# Patient Record
Sex: FEMALE | Race: WHITE
[De-identification: names, ages, dates, MRNs, and addresses within clinical notes are randomized per-mention and may not be internally consistent; named-entity substitution may affect disease eponyms.]

---

## 2017-05-31 ENCOUNTER — HOSPITAL ENCOUNTER (EMERGENCY)
Dept: HOSPITAL 62 - ER | Age: 32
Discharge: HOME | End: 2017-05-31
Payer: OTHER GOVERNMENT

## 2017-05-31 VITALS — SYSTOLIC BLOOD PRESSURE: 140 MMHG | DIASTOLIC BLOOD PRESSURE: 92 MMHG

## 2017-05-31 DIAGNOSIS — E11.9: ICD-10-CM

## 2017-05-31 DIAGNOSIS — Z88.0: ICD-10-CM

## 2017-05-31 DIAGNOSIS — L02.214: Primary | ICD-10-CM

## 2017-05-31 DIAGNOSIS — F17.200: ICD-10-CM

## 2017-05-31 PROCEDURE — 87205 SMEAR GRAM STAIN: CPT

## 2017-05-31 PROCEDURE — 99283 EMERGENCY DEPT VISIT LOW MDM: CPT

## 2017-05-31 PROCEDURE — 87070 CULTURE OTHR SPECIMN AEROBIC: CPT

## 2017-05-31 PROCEDURE — 87075 CULTR BACTERIA EXCEPT BLOOD: CPT

## 2017-05-31 PROCEDURE — 82962 GLUCOSE BLOOD TEST: CPT

## 2017-05-31 PROCEDURE — 87077 CULTURE AEROBIC IDENTIFY: CPT

## 2017-05-31 PROCEDURE — 10060 I&D ABSCESS SIMPLE/SINGLE: CPT

## 2017-05-31 PROCEDURE — 87186 SC STD MICRODIL/AGAR DIL: CPT

## 2017-05-31 NOTE — ER DOCUMENT REPORT
ED Skin Rash/Insect Bite/Abscs





- General


Chief Complaint: Abscess


Stated Complaint: POSSIBLE ABSCESS


Time Seen by Provider: 17 10:22


Notes: 


Patient is a 31-year-old female presents emergency department with a cyst in 

her right groin for 3 months.  Patient states that it has bothered her 

intermittently for the past 3 months.  Currently admits to tenderness to 

palpation without any evidence of redness.  Patient states that she is diabetic 

not been able to check her sugars today.  She has not been able to take her 

insulin today either.


TRAVEL OUTSIDE OF THE [place] IN LAST 30 DAYS: No





- Related Data


Allergies/Adverse Reactions: 


 





Penicillins Allergy (Verified 17 10:07)


 








Home Medications: 


 Current Home Medications





Escitalopram Oxalate [Lexapro 10 mg Tablet] 10 mg PO DAILY 17 [History]


Esomeprazole Mag Trihydrate [Nexium] 40 mg PO DAILY 17 [History]


Gabapentin 400 mg PO TID 17 [History]


Insulin Aspart [Novolog Flexpen] 0 unit SUBCUT .SLD SCALE 17 [History]


Insulin Glargine,Hum.rec.anlog [Toujeo Solostar] 70 unit SQ QHS 17 [

History]


Lisinopril [Prinivil] 20 mg PO DAILY 17 [History]


Loratadine [Claritin] 10 mg PO DAILY PRN 17 [History]


Multivitamin [Multivitamins] 1 each PO DAILY 17 [History]


Simvastatin [Zocor 20 mg Tablet] 20 mg PO QHS 17 [History]











Past Medical History





- Social History


Smoking Status: Current Every Day Smoker


Chew tobacco use (# tins/day): No


Frequency of alcohol use: None


Drug Abuse: None


Family History: Reviewed & Not Pertinent


Patient has suicidal ideation: No


Patient has homicidal ideation: No


Neurological Medical History: Denies: Hx Seizures


Endocrine Medical History: Reports: Hx Diabetes Mellitus Type 2


Renal/ Medical History: Denies: Hx Peritoneal Dialysis


Psychiatric Medical History: Reports: Hx Anxiety, Hx Depression


Past Surgical History: Reports: Hx  Section, Hx Cholecystectomy





- Immunizations


Hx Diphtheria, Pertussis, Tetanus Vaccination: Yes





Review of Systems





- Review of Systems


Constitutional: No symptoms reported


Skin: See HPI


-: Yes All other systems reviewed and negative





Physical Exam





- Notes


Notes: 


PHYSICAL EXAM


GENERAL: Alert, interacts well. 


LUNGS: Clear to auscultation bilaterally, no wheezes, rales, or rhonchi. No 

respiratory distress.


HEART: Regular rate and rhythm. No murmurs, gallops, or rubs.


ABDOMEN: Soft,  nondistended, nontender. No guarding, rebound, or rigidity.. 

Bowel sounds present in all 4 quadrants.


EXTREMITIES: Moves all 4 extremities spontaneously. No edema, radial and 

dorsalis pedis pulses 2/4 bilaterally. No cyanosis. 


NEUROLOGICAL: Alert and oriented x4. Normal speech.


PSYCH: Normal affect, normal mood.








- Skin


Skin Temperature: Warm


Skin Moisture: Dry


Skin Color: Normal


Skin Turgor: Elastic


Skin irregularity: other - Palpable in the right groin closer to her rectum, 

within the dermis without involvement of the epidermis, minimal tenderness


Irregularity with: Tenderness





Course





- Re-evaluation


Re-evalutation: 





17 19:41


This was aspirated for the past using an 18-gauge needle which was extended 

with a scalpel to encourage drainage.  Patient educated on post I&D management 

and will discharge home on antibiotics.  Told to follow-up with primary care 

for adjustments in her diabetic medications.  





- Laboratory


Laboratory results interpreted by me: 


 











  17





  11:57


 


POC Glucose  419 H*














Discharge





- Discharge


Clinical Impression: 


 Abscess





Condition: Good


Disposition: HOME, SELF-CARE


Instructions:  Abscess ([facility]), Cephalexin ([facility]), Trimethoprim-Sulfa ([facility]), Post 

Incision and Drainage, MRSA Cellulitis ([facility])


Prescriptions: 


Cephalexin Monohydrate [Keflex 500 mg Capsule] 500 mg PO QID #20 capsule


Sulfamethoxazole/Trimethoprim [Bactrim Ds Tablet] 1 each PO BID 5 Days


Referrals: 


SHELTON DIAZ MD [Primary Care Provider] - Follow up in 3-5 days

## 2017-06-11 ENCOUNTER — HOSPITAL ENCOUNTER (EMERGENCY)
Dept: HOSPITAL 62 - ER | Age: 32
LOS: 1 days | Discharge: HOME | End: 2017-06-12
Payer: OTHER GOVERNMENT

## 2017-06-11 DIAGNOSIS — E11.9: ICD-10-CM

## 2017-06-11 DIAGNOSIS — Z79.899: ICD-10-CM

## 2017-06-11 DIAGNOSIS — L02.31: Primary | ICD-10-CM

## 2017-06-11 DIAGNOSIS — Z88.0: ICD-10-CM

## 2017-06-11 PROCEDURE — A6266 IMPREG GAUZE NO H20/SAL/YARD: HCPCS

## 2017-06-11 PROCEDURE — 0H98XZZ DRAINAGE OF BUTTOCK SKIN, EXTERNAL APPROACH: ICD-10-PCS | Performed by: GENERAL ACUTE CARE HOSPITAL

## 2017-06-11 PROCEDURE — 99283 EMERGENCY DEPT VISIT LOW MDM: CPT

## 2017-06-11 PROCEDURE — 10060 I&D ABSCESS SIMPLE/SINGLE: CPT

## 2017-06-12 VITALS — DIASTOLIC BLOOD PRESSURE: 74 MMHG | SYSTOLIC BLOOD PRESSURE: 137 MMHG

## 2017-06-12 NOTE — ER DOCUMENT REPORT
ED Skin Rash/Insect Bite/Abscs





- General


Chief Complaint: Abscess Recheck


Stated Complaint: POSSIBLE PAINFUL BOIL/CYST


Time Seen by Provider: 17 00:06


Notes: 


Patient is a 31-year-old female diabetic that comes emergency department for 

chief complaint of a painful area on her right lower buttock near the groin, 

patient states that the area has been worsening over the past 3 days with 

increased size and tenderness.  She states she had the same area drained 1 

month ago.  She states her blood sugars have been improving with her medications

, instead of 500 she is now averaging in the 200s.  Patient denies fever or any 

other symptoms.





TRAVEL OUTSIDE OF THE [place] IN LAST 30 DAYS: No





- Related Data


Allergies/Adverse Reactions: 


 





Penicillins Allergy (Verified 17 10:07)


 











Past Medical History





- General


Information source: Patient





- Social History


Smoking Status: Never Smoker


Frequency of alcohol use: None


Drug Abuse: None


Lives with: Family


Family History: Reviewed & Not Pertinent


Patient has suicidal ideation: No


Patient has homicidal ideation: No


Neurological Medical History: Denies: Hx Seizures


Endocrine Medical History: Reports: Hx Diabetes Mellitus Type 2


Renal/ Medical History: Denies: Hx Peritoneal Dialysis


Psychiatric Medical History: Reports: Hx Anxiety, Hx Depression


Past Surgical History: Reports: Hx  Section, Hx Cholecystectomy





- Immunizations


Hx Diphtheria, Pertussis, Tetanus Vaccination: Yes





Review of Systems





- Review of Systems


Constitutional: No symptoms reported


EENT: No symptoms reported


Cardiovascular: No symptoms reported


Respiratory: No symptoms reported


Gastrointestinal: No symptoms reported


Genitourinary: No symptoms reported


Female Genitourinary: No symptoms reported


Musculoskeletal: No symptoms reported


Skin: See HPI


Hematologic/Lymphatic: No symptoms reported


Neurological/Psychological: No symptoms reported





Physical Exam





- Vital signs


Vitals: 


 











Temp Pulse Resp BP Pulse Ox


 


 97.9 F   114 H  16   141/70 H  96 


 


 17 23:08  17 23:08  17 23:08  17 23:08  17 23:08











Interpretation: Normal





- General


General appearance: Appears well, Alert


In distress: None





- HEENT


Head: Normocephalic, Atraumatic


Eyes: Normal


Pupils: PERRL





- Respiratory


Respiratory status: No respiratory distress


Chest status: Nontender


Breath sounds: Normal


Chest palpation: Normal





- Cardiovascular


Rhythm: Regular.  No: Tachycardia


Heart sounds: Normal auscultation, S1 appreciated, S2 appreciated


Murmur: No





- Abdominal


Inspection: Normal


Distension: No distension


Bowel sounds: Normal


Tenderness: Nontender.  No: Tender


Organomegaly: No organomegaly





- Back


Back: Normal, Nontender





- Extremities


General upper extremity: Normal inspection, Nontender, Normal color, Normal ROM

, Normal temperature


General lower extremity: Normal inspection, Nontender, Normal color, Normal ROM

, Normal temperature, Normal weight bearing.  No: Ketan's sign





- Neurological


Neuro grossly intact: Yes


Cognition: Normal


Orientation: AAOx4


[place] Coma Scale Eye Opening: Spontaneous


[place] Coma Scale Verbal: Oriented


[place] Coma Scale Motor: Obeys Commands


[place] Coma Scale Total: 15


Speech: Normal


Motor strength normal: LUE, RUE, LLE, RLE


Sensory: Normal





- Psychological


Associated symptoms: Normal affect, Normal mood





- Skin


Skin Temperature: Warm


Skin Moisture: Dry


Skin Color: Normal


Skin irregularity: Abscess - There is an erythematous, tender, indurated, 

fluctuant area over the inferior aspect of the right gluteal area near the 

inguinal area, no erythema or induration extending to the anal area, no 

extension towards the genitals.  No nearby fat adenopathy noted.





Course





- Re-evaluation


Re-evalutation: 


Abscess clean, opened, drained, packed, patient will be placed on antibiotics, 

discussed treatment in detail, discussed care, follow-up, return precautions.  

Patient states understanding and agreement.





- Vital Signs


Vital signs: 


 











Temp Pulse Resp BP Pulse Ox


 


 98.1 F   95   16   137/74 H  97 


 


 17 01:45  17 01:45  17 01:45  17 01:45  17 01:45














Procedures





- Incision and Drainage


  ** Right buttock


Type: Single


Anesthetic type: 1% Lidocaine


mL's of anesthetic: 7


Blade size: 11


I&D procedure: Iodoform packing placed, Other - Surgical cleanser


Incision Method: Incision made by scalpel


Amount/type of drainage: Moderate, purulent, bloody





Discharge





- Discharge


Clinical Impression: 


 Abscess





Condition: Stable


Disposition: HOME, SELF-CARE


Additional Instructions: 


Packing needs to come out in 48 hours. 


Take the doxycycline antibiotic as directed, take the pain medication if needed.


Follow up with Primary Care for additional management. 


Return to the ED for any concerning or worsening symptoms - fever, spreading 

redness, etc. 





Prescriptions: 


Doxycycline Hyclate 100 mg PO BID #14 capsule


Oxycodone HCl/Acetaminophen [Percocet 5-325 mg Tablet] 1 - 2 tab PO Q4H PRN #15 

tablet


 PRN Reason: 


Referrals: 


SHELTON DIAZ MD [Primary Care Provider] - Follow up as needed

## 2017-06-14 ENCOUNTER — HOSPITAL ENCOUNTER (EMERGENCY)
Dept: HOSPITAL 62 - ER | Age: 32
Discharge: HOME | End: 2017-06-14
Payer: OTHER GOVERNMENT

## 2017-06-14 VITALS — DIASTOLIC BLOOD PRESSURE: 77 MMHG | SYSTOLIC BLOOD PRESSURE: 134 MMHG

## 2017-06-14 DIAGNOSIS — R11.10: ICD-10-CM

## 2017-06-14 DIAGNOSIS — R09.89: ICD-10-CM

## 2017-06-14 DIAGNOSIS — R05: ICD-10-CM

## 2017-06-14 DIAGNOSIS — B34.9: ICD-10-CM

## 2017-06-14 DIAGNOSIS — R50.9: ICD-10-CM

## 2017-06-14 DIAGNOSIS — L02.31: Primary | ICD-10-CM

## 2017-06-14 PROCEDURE — 99283 EMERGENCY DEPT VISIT LOW MDM: CPT

## 2017-06-14 NOTE — ER DOCUMENT REPORT
ED General





- General


Chief Complaint: Cold Symptoms


Stated Complaint: FEVER/VOMITING


Time Seen by Provider: 17 13:29


Notes: 


30 yo diabetic female c/o cold s/s x 3 days.  coincidentally, pt had abscess I&

D 2 days prior to symptoms starting.  + fever, chills, cough, chest congestion.

  pt removed packing from abscess yesterday, continues to drain.  


TRAVEL OUTSIDE OF THE [place] IN LAST 30 DAYS: No





- HPI


Quality of pain: Pressure, Throbbing


Associated symptoms: Body/muscle aches, Chills, Nonproductive cough, Fever


Exacerbated by: Movement


Relieved by: Denies


Similar symptoms previously: Yes


Recently seen / treated by doctor: Yes - ED 2 days ago





- Related Data


Allergies/Adverse Reactions: 


 





Penicillins Allergy (Verified 17 12:57)


 











Past Medical History





- General


Information source: Patient





- Social History


Smoking Status: Never Smoker


Frequency of alcohol use: None


Drug Abuse: None


Lives with: Family


Family History: Reviewed & Not Pertinent


Patient has suicidal ideation: No


Patient has homicidal ideation: No


Neurological Medical History: Denies: Hx Seizures


Endocrine Medical History: Reports: Hx Diabetes Mellitus Type 2


Renal/ Medical History: Denies: Hx Peritoneal Dialysis


Psychiatric Medical History: Reports: Hx Anxiety, Hx Depression


Past Surgical History: Reports: Hx  Section, Hx Cholecystectomy





- Immunizations


Hx Diphtheria, Pertussis, Tetanus Vaccination: Yes





Review of Systems





- Review of Systems


Constitutional: See HPI


EENT: No symptoms reported


Cardiovascular: No symptoms reported


Respiratory: No symptoms reported


Gastrointestinal: No symptoms reported


Genitourinary: No symptoms reported


Female Genitourinary: No symptoms reported


Musculoskeletal: No symptoms reported


Skin: No symptoms reported


Hematologic/Lymphatic: No symptoms reported


Neurological/Psychological: No symptoms reported





Physical Exam





- Vital signs


Vitals: 





 











Temp Pulse Resp BP Pulse Ox


 


 98.2 F   95   16   134/77 H  96 


 


 17 12:56  17 12:56  17 12:56  17 12:56  17 12:56











Interpretation: Normal





- General


General appearance: Appears well, Alert


In distress: None


Notes: 


morbidly obese





- HEENT


Head: Normocephalic, Atraumatic


Eyes: Normal


Conjunctiva: Normal


Pupils: PERRL





- Respiratory


Respiratory status: No respiratory distress


Chest status: Nontender


Breath sounds: Normal


Chest palpation: Normal





- Cardiovascular


Rhythm: Regular


Heart sounds: Normal auscultation


Murmur: No





- Abdominal


Inspection: Normal


Distension: No distension


Bowel sounds: Normal


Tenderness: Nontender


Organomegaly: No organomegaly





- Back


Back: Normal, Nontender





- Extremities


General upper extremity: Normal inspection, Nontender, Normal color, Normal ROM

, Normal temperature


General lower extremity: Normal inspection, Nontender, Normal color, Normal ROM

, Normal temperature, Normal weight bearing.  No: Ketan's sign





- Neurological


Neuro grossly intact: Yes


Cognition: Normal


Orientation: AAOx4


[place] Coma Scale Eye Opening: Spontaneous


[place] Coma Scale Verbal: Oriented


[place] Coma Scale Motor: Obeys Commands


[place] Coma Scale Total: 15


Speech: Normal


Motor strength normal: LUE, RUE, LLE, RLE


Sensory: Normal





- Psychological


Associated symptoms: Normal affect, Normal mood





- Skin


Skin Temperature: Warm


Skin Moisture: Dry


Skin Color: Normal


Skin irregularity: Abscess - right gluteal fold with draining abscess.  

moderate amount of purulent material expressed.  no surrounding erythema





Course





- Re-evaluation


Re-evalutation: 





17 13:50


wound culture reviewed from previous visit.  + peptostrepto and + enterococcus 

faecalis.  pt has been covered with Bactrim, Keflex and presently Doxy.  

Allergic to PCN.  will add Flagyl per C&S report.  I think URI s/s are 

concurrent viral infection and most likely not related to wound.  low suspicion 

for sepsis.  pt afebrile, normtensive, not tachycardic.  pt educated on wound 

care for abscess and instructed to follow up with primary care tomorrow.  pt 

stable for discharge and agreeable with plan





- Vital Signs


Vital signs: 





 











Temp Pulse Resp BP Pulse Ox


 


 98.2 F   95   16   134/77 H  96 


 


 17 12:56  17 12:56  17 12:56  17 12:56  17 12:56














Discharge





- Discharge


Clinical Impression: 


 Viral illness, Wound check, abscess





Condition: Stable


Disposition: HOME, SELF-CARE


Instructions:  Viral Syndrome ([facility]), Abscess ([facility]), Soap Cleansing ([facility]), 

Antibiotic Therapy ([facility])


Additional Instructions: 


wash wound with antibacterial soap and water, irrigate well


take antibiotics as prescribed


follow up with primary care tomorrow


Prescriptions: 


Fluconazole [Diflucan] 150 mg PO DAILY #2 tablet


Metronidazole [Flagyl 500 mg Tablet] 500 mg PO BID #14 tablet

## 2018-03-09 ENCOUNTER — HOSPITAL ENCOUNTER (INPATIENT)
Dept: HOSPITAL 62 - ER | Age: 33
LOS: 5 days | Discharge: HOME HEALTH | DRG: 580 | End: 2018-03-14
Attending: INTERNAL MEDICINE | Admitting: INTERNAL MEDICINE
Payer: COMMERCIAL

## 2018-03-09 DIAGNOSIS — F41.9: ICD-10-CM

## 2018-03-09 DIAGNOSIS — F32.9: ICD-10-CM

## 2018-03-09 DIAGNOSIS — L02.413: Primary | ICD-10-CM

## 2018-03-09 DIAGNOSIS — B95.5: ICD-10-CM

## 2018-03-09 DIAGNOSIS — B95.61: ICD-10-CM

## 2018-03-09 DIAGNOSIS — L03.113: ICD-10-CM

## 2018-03-09 DIAGNOSIS — Z98.891: ICD-10-CM

## 2018-03-09 DIAGNOSIS — Z79.4: ICD-10-CM

## 2018-03-09 DIAGNOSIS — E66.9: ICD-10-CM

## 2018-03-09 DIAGNOSIS — F17.210: ICD-10-CM

## 2018-03-09 DIAGNOSIS — S50.11XA: ICD-10-CM

## 2018-03-09 DIAGNOSIS — Z90.49: ICD-10-CM

## 2018-03-09 DIAGNOSIS — E11.65: ICD-10-CM
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
## 2018-03-09 DIAGNOSIS — F14.10: ICD-10-CM

## 2018-03-09 DIAGNOSIS — Z88.0: ICD-10-CM
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
## 2018-03-09 DIAGNOSIS — E11.42: ICD-10-CM

## 2018-03-09 LAB
ADD MANUAL DIFF: NO
ANION GAP SERPL CALC-SCNC: 13 MMOL/L (ref 5–19)
APPEARANCE UR: (no result)
APTT PPP: YELLOW S
BASOPHILS # BLD AUTO: 0.1 10^3/UL (ref 0–0.2)
BASOPHILS NFR BLD AUTO: 0.8 % (ref 0–2)
BILIRUB UR QL STRIP: NEGATIVE
BUN SERPL-MCNC: 11 MG/DL (ref 7–20)
CALCIUM: 9.2 MG/DL (ref 8.4–10.2)
CHLORIDE SERPL-SCNC: 96 MMOL/L (ref 98–107)
CO2 SERPL-SCNC: 25 MMOL/L (ref 22–30)
EOSINOPHIL # BLD AUTO: 0.2 10^3/UL (ref 0–0.6)
EOSINOPHIL NFR BLD AUTO: 1.2 % (ref 0–6)
ERYTHROCYTE [DISTWIDTH] IN BLOOD BY AUTOMATED COUNT: 14 % (ref 11.5–14)
GLUCOSE SERPL-MCNC: 286 MG/DL (ref 75–110)
GLUCOSE UR STRIP-MCNC: >=500 MG/DL
HCT VFR BLD CALC: 42.3 % (ref 36–47)
HGB BLD-MCNC: 14.1 G/DL (ref 12–15.5)
KETONES UR STRIP-MCNC: NEGATIVE MG/DL
LYMPHOCYTES # BLD AUTO: 3.1 10^3/UL (ref 0.5–4.7)
LYMPHOCYTES NFR BLD AUTO: 19 % (ref 13–45)
MCH RBC QN AUTO: 30.5 PG (ref 27–33.4)
MCHC RBC AUTO-ENTMCNC: 33.4 G/DL (ref 32–36)
MCV RBC AUTO: 91 FL (ref 80–97)
MONOCYTES # BLD AUTO: 1 10^3/UL (ref 0.1–1.4)
MONOCYTES NFR BLD AUTO: 6 % (ref 3–13)
NEUTROPHILS # BLD AUTO: 11.8 10^3/UL (ref 1.7–8.2)
NEUTS SEG NFR BLD AUTO: 73 % (ref 42–78)
NITRITE UR QL STRIP: NEGATIVE
PH UR STRIP: 7 [PH] (ref 5–9)
PLATELET # BLD: 342 10^3/UL (ref 150–450)
POTASSIUM SERPL-SCNC: 4.2 MMOL/L (ref 3.6–5)
PROT UR STRIP-MCNC: NEGATIVE MG/DL
RBC # BLD AUTO: 4.63 10^6/UL (ref 3.72–5.28)
SODIUM SERPL-SCNC: 133.6 MMOL/L (ref 137–145)
SP GR UR STRIP: 1.04
TOTAL CELLS COUNTED % (AUTO): 100 %
UROBILINOGEN UR-MCNC: 4 MG/DL (ref ?–2)
WBC # BLD AUTO: 16.2 10^3/UL (ref 4–10.5)

## 2018-03-09 PROCEDURE — 82553 CREATINE MB FRACTION: CPT

## 2018-03-09 PROCEDURE — 85025 COMPLETE CBC W/AUTO DIFF WBC: CPT

## 2018-03-09 PROCEDURE — 90686 IIV4 VACC NO PRSV 0.5 ML IM: CPT

## 2018-03-09 PROCEDURE — 96375 TX/PRO/DX INJ NEW DRUG ADDON: CPT

## 2018-03-09 PROCEDURE — 82962 GLUCOSE BLOOD TEST: CPT

## 2018-03-09 PROCEDURE — 84484 ASSAY OF TROPONIN QUANT: CPT

## 2018-03-09 PROCEDURE — 36415 COLL VENOUS BLD VENIPUNCTURE: CPT

## 2018-03-09 PROCEDURE — 84443 ASSAY THYROID STIM HORMONE: CPT

## 2018-03-09 PROCEDURE — 87077 CULTURE AEROBIC IDENTIFY: CPT

## 2018-03-09 PROCEDURE — 84439 ASSAY OF FREE THYROXINE: CPT

## 2018-03-09 PROCEDURE — 99284 EMERGENCY DEPT VISIT MOD MDM: CPT

## 2018-03-09 PROCEDURE — 87075 CULTR BACTERIA EXCEPT BLOOD: CPT

## 2018-03-09 PROCEDURE — 90471 IMMUNIZATION ADMIN: CPT

## 2018-03-09 PROCEDURE — 83036 HEMOGLOBIN GLYCOSYLATED A1C: CPT

## 2018-03-09 PROCEDURE — 96365 THER/PROPH/DIAG IV INF INIT: CPT

## 2018-03-09 PROCEDURE — 90715 TDAP VACCINE 7 YRS/> IM: CPT

## 2018-03-09 PROCEDURE — 81001 URINALYSIS AUTO W/SCOPE: CPT

## 2018-03-09 PROCEDURE — 84100 ASSAY OF PHOSPHORUS: CPT

## 2018-03-09 PROCEDURE — 80061 LIPID PANEL: CPT

## 2018-03-09 PROCEDURE — 81025 URINE PREGNANCY TEST: CPT

## 2018-03-09 PROCEDURE — 80307 DRUG TEST PRSMV CHEM ANLYZR: CPT

## 2018-03-09 PROCEDURE — 00400 ANES INTEGUMENTARY SYS NOS: CPT

## 2018-03-09 PROCEDURE — 87040 BLOOD CULTURE FOR BACTERIA: CPT

## 2018-03-09 PROCEDURE — 82550 ASSAY OF CK (CPK): CPT

## 2018-03-09 PROCEDURE — 82140 ASSAY OF AMMONIA: CPT

## 2018-03-09 PROCEDURE — 80076 HEPATIC FUNCTION PANEL: CPT

## 2018-03-09 PROCEDURE — 87070 CULTURE OTHR SPECIMN AEROBIC: CPT

## 2018-03-09 PROCEDURE — A6266 IMPREG GAUZE NO H20/SAL/YARD: HCPCS

## 2018-03-09 PROCEDURE — 87205 SMEAR GRAM STAIN: CPT

## 2018-03-09 PROCEDURE — 83735 ASSAY OF MAGNESIUM: CPT

## 2018-03-09 PROCEDURE — 80048 BASIC METABOLIC PNL TOTAL CA: CPT

## 2018-03-09 PROCEDURE — 87186 SC STD MICRODIL/AGAR DIL: CPT

## 2018-03-09 PROCEDURE — S0028 INJECTION, FAMOTIDINE, 20 MG: HCPCS

## 2018-03-09 RX ADMIN — SODIUM CHLORIDE PRN ML: 9 INJECTION, SOLUTION INTRAVENOUS at 23:55

## 2018-03-09 NOTE — RADIOLOGY REPORT (SQ)
EXAM DESCRIPTION:  FOREARM RIGHT



COMPLETED DATE/TIME:  3/9/2018 10:21 pm



REASON FOR STUDY:  fall, swelling, pain



COMPARISON:  None.



NUMBER OF VIEWS:  Two views.



TECHNIQUE:  Two radiographic images acquired of the right forearm, including elbow and wrist in at le
ast one projection.



LIMITATIONS:  None.



FINDINGS:  MINERALIZATION: Normal.

BONES: No acute fracture.  No worrisome bone lesions.

SOFT TISSUES: Mild swelling.

OTHER: No other significant finding.



IMPRESSION:  No fracture.



TECHNICAL DOCUMENTATION:  JOB ID:  [id number]

[id number]

 2011 [employer]- All Rights Reserved



Reading location - IP/workstation name: [email or web address]

## 2018-03-09 NOTE — ER DOCUMENT REPORT
ED Medical Screen (RME)





- General


Chief Complaint: Skin Problem


Stated Complaint: RIGHT ARM PAIN, SWELLING


Time Seen by Provider: 18 21:36


Notes: 





32-year-old injury where her arm went through a surface and she got splinters 

in her arm, she states afterwards she noticed an infection on her upper arm 

near her armpit, then 1 week ago she started getting redness and pain near her 

AC area.  She now has an abscess over the AC area with pain in addition to the 

arm swelling and bruising.  She states she had chills and thought she was 

running a fever, she is an insulin-dependent diabetic and states her sugars 

have been all over the place.  She denies IV drug abuse.


TRAVEL OUTSIDE OF THE [place] IN LAST 30 DAYS: No





- Related Data


Allergies/Adverse Reactions: 


 





Penicillins Allergy (Verified 18 19:38)


 











Past Medical History





- Social History


Chew tobacco use (# tins/day): No


Frequency of alcohol use: None


Drug Abuse: None


Neurological Medical History: Denies: Hx Seizures


Endocrine Medical History: Reports: Hx Diabetes Mellitus Type 2


Renal/ Medical History: Denies: Hx Peritoneal Dialysis


Psychiatric Medical History: Reports: Hx Anxiety, Hx Depression


Past Surgical History: Reports: Hx  Section, Hx Cholecystectomy





- Immunizations


Hx Diphtheria, Pertussis, Tetanus Vaccination: Yes





Physical Exam





- Vital signs


Vitals: 





 











Temp Pulse Resp BP Pulse Ox


 


 98.4 F   105 H  16   160/73 H  98 


 


 18 19:55  18 19:55  18 19:55  18 19:55  18 19:55














- Cardiovascular


Rhythm: Regular, Tachycardia


Heart sounds: Normal auscultation, S1 appreciated, S2 appreciated





- Extremities


General upper extremity: Other - Right AC area with tender, erythematous, 

fluctuant abscess, there is bruising to the forearm, normal distal 

neurovascular exam





Course





- Vital Signs


Vital signs: 





 











Temp Pulse Resp BP Pulse Ox


 


 98.4 F   105 H  16   160/73 H  98 


 


 18 19:55  18 19:55  18 19:55  18 19:55  18 19:55

## 2018-03-09 NOTE — PDOC CONSULTATION
History of Present Illness


Admission Date/PCP: 


  18 23:22





  SHELTON JOE





Patient complains of: Pains right antecubital area


History of Present Illness: 


GONZALO MEEK is a 32 year old female who fell on her porch and injured right 

arm through a hole on the wooden floor about a week ago. C/O a bruise right arm 

followed by redness and pains which are getting worse over the past few days. 

She tried placing warm compresses over  her right arm without improvement. 

Today got really worse and painful then went to ED. She did have low grade 

fever of 99.9 but no chills. C/O pains all over right arm.








Past Medical History


Neurological Medical History: 


   Denies: Seizures


Endocrine Medical History: Reports: Diabetes Mellitus Type 2


Psychiatric Medical History: Reports: Depression





Past Surgical History


Past Surgical History: Reports:  Section, Cholecystectomy





Social History


Smoking Status: Current Every Day Smoker


Cigarettes Packs Per Day: 0.5


Frequency of Alcohol Use: Rare


Hx Recreational Drug Use: Yes - cocaine in the past


Drugs: Cocaine, Marijuana


Hx Prescription Drug Abuse: No





Family History


Family History: Reviewed & Not Pertinent


Parental Family History Reviewed: Yes - father  of alcohol abuse age53


Children Family History Reviewed: No


Sibling(s) Family History Reviewed.: No





Medication/Allergy


Home Medications: 








Cephalexin Monohydrate [Keflex 500 mg Capsule] 500 mg PO QID #20 capsule  


Escitalopram Oxalate [Lexapro 10 mg Tablet] 10 mg PO DAILY 17 


Esomeprazole Mag Trihydrate [Nexium] 40 mg PO DAILY 17 


Gabapentin 400 mg PO TID 17 


Insulin Aspart [Novolog Flexpen] 0 unit SUBCUT .SLD SCALE 17 


Insulin Glargine,Hum.rec.anlog [Toujeo Solostar] 70 unit SQ QHS 17 


Lisinopril [Prinivil] 20 mg PO DAILY 17 


Loratadine [Claritin] 10 mg PO DAILY PRN 17 


Multivitamin [Multivitamins] 1 each PO DAILY 17 


Simvastatin [Zocor 20 mg Tablet] 20 mg PO QHS 17 


Sulfamethoxazole/Trimethoprim [Bactrim Ds Tablet] 1 each PO BID 5 Days  tablet 

17 


Doxycycline Hyclate 100 mg PO BID #14 capsule 17 


Oxycodone HCl/Acetaminophen [Percocet 5-325 mg Tablet] 1 - 2 tab PO Q4H PRN #15 

tablet 17 


Fluconazole [Diflucan] 150 mg PO DAILY #2 tablet 17 


Metronidazole [Flagyl 500 mg Tablet] 500 mg PO BID #14 tablet 17 








Allergies/Adverse Reactions: 


 





Penicillins Allergy (Verified 18 19:38)


 











Review of Systems


Constitutional: PRESENT: fever(s)


Eyes: PRESENT: other - no visual/hearing changes


Cardiovascular: PRESENT: other - no chest pains


Gastrointestinal: PRESENT: other - no pains


Genitourinary: PRESENT: other - no dysuria


Integumentary: PRESENT: erythema - and swelling right arm


Neurological: PRESENT: other - no seizures


Hematologic/Lymphatic: PRESENT: other - no easy bruising





Physical Exam


Vital Signs: 


 











Temp Pulse Resp BP Pulse Ox


 


 98.4 F   105 H  16   160/73 H  98 


 


 18 19:55  18 19:55  18 19:55  18 19:55  18 19:55











General appearance: PRESENT: mild distress


Head exam: PRESENT: atraumatic


Mouth exam: PRESENT: moist


Neck exam: PRESENT: full ROM


Respiratory exam: PRESENT: clear to auscultation van


Cardiovascular exam: PRESENT: RRR


Pulses: PRESENT: normal radial pulses


Vascular exam: PRESENT: normal capillary refill


GI/Abdominal exam: PRESENT: soft


Extremities exam: PRESENT: tenderness, other - erythematous swelling with 

firmness about 8 cm in diameter antecubital area right arm. More diffuse slight 

erythema wit mild tenderness superior and inferior to the swelling


Musculoskeletal exam: PRESENT: ambulatory


Neurological exam: PRESENT: alert, oriented to person, oriented to place, 

oriented to time, oriented to situation


Psychiatric exam: PRESENT: appropriate affect


Skin exam: PRESENT: normal color, warm





Results


Impressions: 


 





Forearm X-Ray  18 21:36


IMPRESSION:  No fracture.


 














Assessment & Plan





- Diagnosis








(2) Diabetes


Qualifiers: 


   Diabetes mellitus type: type 2   Diabetes mellitus long term insulin use: 

without long term use   Diabetes mellitus complication status: with skin 

complications   Diabetes mellitus complication detail: with other skin 

complication   Qualified Code(s): E11.628 - Type 2 diabetes mellitus with other 

skin complications   


Is this a current diagnosis for this admission?: Yes   





- Time


Time Spent: 30 to 50 Minutes





- Inpatient Certification


Based on my medical assessment, after consideration of the patient's 

comorbidities, presenting symptoms, or acuity I expect that the services needed 

warrant INPATIENT care.: No


I certify that my determination is in accordance with my understanding of 

Medicare's requirements for reasonable and necessary INPATIENT services [42 CFR 

412.3e].: Yes


Medical Necessity: Need For IV Fluids, Need for Pain Control, Need for IV 

Antibiotics, Need for Surgery





- Plan Summary


Plan Summary: 





Medical mx of DM


IV antibiotics after blood c/s


For I&D of right antecubital abscess

## 2018-03-09 NOTE — ER DOCUMENT REPORT
ED General





- General


Chief Complaint: Skin Problem


Stated Complaint: RIGHT ARM PAIN, SWELLING


Time Seen by Provider: 18 21:36


Mode of Arrival: Ambulatory


Information source: Patient


Notes: 





32-year-old female diabetic presents with complaints of right antecubital 

swelling and redness.  Patient notes that symptoms have been ongoing for 

approximately 1 week, denies any fevers or chills, notes her blood sugars have 

been elevated.  Patient does note redness, she has had 4 previous abscesses


TRAVEL OUTSIDE OF THE [place] IN LAST 30 DAYS: No





- HPI


Onset: Last week


Onset/Duration: Persistent, Worse


Quality of pain: Achy


Severity: Mild


Pain Level: 2


Associated symptoms: Other


Exacerbated by: Denies


Relieved by: Denies


Similar symptoms previously: Yes


Recently seen / treated by doctor: Yes





- Related Data


Allergies/Adverse Reactions: 


 





Penicillins Allergy (Verified 18 19:38)


 











Past Medical History





- Social History


Smoking Status: Current Every Day Smoker


Cigarette use (# per day): Yes


Chew tobacco use (# tins/day): No


Smoking Education Provided: No


Frequency of alcohol use: None


Drug Abuse: None


Family History: Reviewed & Not Pertinent


Patient has suicidal ideation: No


Patient has homicidal ideation: No


Neurological Medical History: Denies: Hx Seizures


Endocrine Medical History: Reports: Hx Diabetes Mellitus Type 2


Renal/ Medical History: Denies: Hx Peritoneal Dialysis


Psychiatric Medical History: Reports: Hx Anxiety, Hx Depression


Past Surgical History: Reports: Hx  Section, Hx Cholecystectomy





- Immunizations


Hx Diphtheria, Pertussis, Tetanus Vaccination: Yes





Review of Systems





- Review of Systems


Notes: 





REVIEW OF SYSTEMS:


CONSTITUTIONAL :  Denies fever,  chills, or sweats.  Denies recent illness.


EENT:   Denies eye, ear, throat, or mouth pain or symptoms.  Denies nasal or 

sinus congestion or discharge.  Denies throat, tongue, or mouth swelling or 

difficulty swallowing.


CARDIOVASCULAR:  Denies chest pain.  Denies palpitations or racing or irregular 

heart beat.  Denies ankle edema.


RESPIRATORY:  Denies cough, cold, or chest congestion.  Denies shortness of 

breath, difficulty breathing, or wheezing.


GASTROINTESTINAL:  Denies abdominal pain or distention.  Denies nausea, vomiting

, or diarrhea.  Denies blood in vomitus, stools, or per rectum.  Denies black, 

tarry stools.  Denies constipation. 


GENITOURINARY:  Denies difficulty urinating, painful urination, burning, 

frequency, blood in urine, or discharge.


FEMALE  GENITOURINARY:  Denies vaginal bleeding, heavy or abnormal periods, 

irregular periods.  Denies vaginal discharge or odor. 


MUSCULOSKELETAL:  Denies back or neck pain or stiffness.  Denies joint pain or 

swelling.


SKIN: Admits to redness swelling of the right antecubital


HEMATOLOGIC :   Denies easy bruising or bleeding.


LYMPHATIC:  Denies swollen, enlarged glands.


NEUROLOGICAL:  Denies confusion or altered mental status.  Denies passing out 

or loss of consciousness.  Denies dizziness or lightheadedness.  Denies 

headache.  Denies weakness or paralysis or loss of use of either side.  Denies 

problems with gait or speech.  Denies sensory loss, numbness, or tingling.  

Denies seizures.


PSYCHIATRIC:  Denies anxiety or stress.  Denies depression, suicidal ideation, 

or homicidal ideation.





ALL OTHER SYSTEMS REVIEWED AND NEGATIVE.











PHYSICAL EXAMINATION:





GENERAL: Well-appearing, well-nourished and in no acute distress.





HEAD: Atraumatic, normocephalic.





EYES: Pupils equal round and reactive to light, extraocular movements intact, 

conjunctiva are normal.





ENT: Nares patent, oropharynx clear without exudates.  Moist mucous membranes.





NECK: Normal range of motion, supple without lymphadenopathy





LUNGS: Breath sounds clear to auscultation bilaterally and equal.  No wheezes 

rales or rhonchi.





HEART: Regular rate and rhythm without murmurs





ABDOMEN: Soft, nontender, nondistended abdomen.  No guarding, no rebound.  No 

masses appreciated.





Female : deferred





Musculoskeletal: Normal range of motion, no pitting or edema.  No cyanosis.





NEUROLOGICAL: Cranial nerves grossly intact.  Normal speech, normal gait.  

Normal sensory, motor exams





PSYCH: Normal mood, normal affect.





SKIN: Abscess noted of the right antecubital measuring approximately 6 x 6 cm 

with streaking mid bicep

















Dictation was performed using Dragon voice recognition software





Physical Exam





- Vital signs


Vitals: 


 











Temp Pulse Resp BP Pulse Ox


 


 98.4 F   105 H  16   160/73 H  98 


 


 18 19:55  18 19:55  18 19:55  18 19:55  18 19:55














Course





- Re-evaluation


Re-evalutation: 


Deep abscesses noted, patient is noted to be tachycardic, I do believe they 

will require surgical intervention


18 22:49


I spoke with  and requested admission for abscess , elevated wbc 

count 


03/10/18 00:21


Patient was admitted to primary care physician clindamycin 900 mg was given 

plan to take to the OR tomorrow





- Vital Signs


Vital signs: 


 











Temp Pulse Resp BP Pulse Ox


 


 98.4 F   105 H  16   160/73 H  98 


 


 18 19:55  18 19:55  18 19:55  18 19:55  18 19:55














- Laboratory


Result Diagrams: 


 18 22:08





 18 22:08


Laboratory results interpreted by me: 


 











  18





  22:00 22:08 22:08


 


WBC   16.2 H 


 


Absolute Neutrophils   11.8 H 


 


Sodium    133.6 L


 


Chloride    96 L


 


Glucose    286 H


 


Urine Glucose (UA)  >=500 H  


 


Urine Blood  MODERATE H  


 


Urine Urobilinogen  4.0 H  














- Diagnostic Test


Radiology reviewed: Image reviewed, Reports reviewed - No acute fracture





Discharge





- Discharge


Clinical Impression: 


 Cellulitis and abscess of upper arm and forearm





Diabetes


Qualifiers:


 Diabetes mellitus type: type 2 Diabetes mellitus long term insulin use: 

without long term use Diabetes mellitus complication status: with skin 

complications Diabetes mellitus complication detail: with other skin 

complication Qualified Code(s): E11.628 - Type 2 diabetes mellitus with other 

skin complications





Condition: Fair


Disposition: ADMITTED AS INPATIENT


Admitting Provider: Kellee


Unit Admitted: Surgical Floor

## 2018-03-10 LAB
BARBITURATES UR QL SCN: NEGATIVE
CK MB SERPL-MCNC: < 0.22 NG/ML (ref ?–4.55)
FREE T4 (FREE THYROXINE): 1.39 NG/DL (ref 0.78–2.19)
METHADONE UR QL SCN: NEGATIVE
PCP UR QL SCN: NEGATIVE
PHOSPHATE SERPL-MCNC: 4.1 MG/DL (ref 2.5–4.5)
TROPONIN I SERPL-MCNC: < 0.012 NG/ML
TSH SERPL-ACNC: 0.87 UIU/ML (ref 0.47–4.68)
URINE AMPHETAMINES SCREEN: NEGATIVE
URINE BENZODIAZEPINES SCREEN: NEGATIVE
URINE COCAINE SCREEN: (no result)
URINE MARIJUANA (THC) SCREEN: NEGATIVE

## 2018-03-10 PROCEDURE — 0JDG0ZZ EXTRACTION OF RIGHT LOWER ARM SUBCUTANEOUS TISSUE AND FASCIA, OPEN APPROACH: ICD-10-PCS | Performed by: SURGERY

## 2018-03-10 PROCEDURE — 0J9G0ZZ DRAINAGE OF RIGHT LOWER ARM SUBCUTANEOUS TISSUE AND FASCIA, OPEN APPROACH: ICD-10-PCS | Performed by: SURGERY

## 2018-03-10 RX ADMIN — CLINDAMYCIN PHOSPHATE SCH ML: 12 INJECTION, SOLUTION INTRAVENOUS at 05:18

## 2018-03-10 RX ADMIN — CLINDAMYCIN PHOSPHATE SCH ML: 12 INJECTION, SOLUTION INTRAVENOUS at 14:10

## 2018-03-10 RX ADMIN — KETOROLAC TROMETHAMINE SCH MG: 30 INJECTION, SOLUTION INTRAMUSCULAR at 14:10

## 2018-03-10 RX ADMIN — KETOROLAC TROMETHAMINE SCH MG: 30 INJECTION, SOLUTION INTRAMUSCULAR at 21:44

## 2018-03-10 RX ADMIN — DOCUSATE SODIUM SCH MG: 100 CAPSULE, LIQUID FILLED ORAL at 17:21

## 2018-03-10 RX ADMIN — CLINDAMYCIN PHOSPHATE SCH ML: 12 INJECTION, SOLUTION INTRAVENOUS at 21:43

## 2018-03-10 RX ADMIN — INSULIN LISPRO PRN UNIT: 100 INJECTION, SOLUTION INTRAVENOUS; SUBCUTANEOUS at 08:01

## 2018-03-10 RX ADMIN — INSULIN LISPRO PRN UNIT: 100 INJECTION, SOLUTION INTRAVENOUS; SUBCUTANEOUS at 21:44

## 2018-03-10 RX ADMIN — FENTANYL CITRATE PRN MCG: 50 INJECTION INTRAMUSCULAR; INTRAVENOUS at 05:17

## 2018-03-10 RX ADMIN — FENTANYL CITRATE PRN MCG: 50 INJECTION INTRAMUSCULAR; INTRAVENOUS at 01:41

## 2018-03-10 RX ADMIN — INSULIN LISPRO PRN UNIT: 100 INJECTION, SOLUTION INTRAVENOUS; SUBCUTANEOUS at 16:46

## 2018-03-10 RX ADMIN — INSULIN LISPRO PRN UNIT: 100 INJECTION, SOLUTION INTRAVENOUS; SUBCUTANEOUS at 14:08

## 2018-03-10 RX ADMIN — KETOROLAC TROMETHAMINE SCH MG: 30 INJECTION, SOLUTION INTRAMUSCULAR at 05:17

## 2018-03-10 NOTE — PDOC H&P
History of Present Illness


Admission Date/PCP: 


  18 23:22





  SHELTONJEREMY DIAZ





History of Present Illness: 





Patient is a 32-year-old female with history of diabetes mellitus, she came to 

the emergency room for evaluation of swelling and pain of the right arm in the 

antecubital fossa, for the last 1 week.  The swelling was progressively getting 

bigger there was associated chills or fever, she could emergency room for 

evaluation there was associated leukocytosis.  She came to the emergency room 

last night, she was seen by the surgeon, she underwent incision and drainage 

this morning.  She was found to have complex superficial and deep abscess 

involving the antecubital fossa skin, subcutaneous tissue fascia muscle.





Past Medical History


Endocrine Medical History: Reports: Diabetes Mellitus Type 2, Obesity


Psychiatric Medical History: Reports: Depression





Past Surgical History


Past Surgical History: Reports:  Section, Cholecystectomy





Social History


Smoking Status: Current Every Day Smoker


Cigarettes Packs Per Day: 0.5


Frequency of Alcohol Use: None


Hx Recreational Drug Use: Yes - cocaine in the past


Drugs: None


Hx Prescription Drug Abuse: No





- Advance Directive


Resuscitation Status: Full Code





Family History


Family History: Reviewed & Not Pertinent


Parental Family History Reviewed: Yes


Children Family History Reviewed: Yes


Sibling(s) Family History Reviewed.: Yes





Medication/Allergy


Home Medications: 








Cephalexin Monohydrate [Keflex 500 mg Capsule] 500 mg PO QID #20 capsule  


Escitalopram Oxalate [Lexapro 10 mg Tablet] 10 mg PO DAILY 17 


Esomeprazole Mag Trihydrate [Nexium] 40 mg PO DAILY 17 


Gabapentin 400 mg PO TID 17 


Insulin Aspart [Novolog Flexpen] 0 unit SUBCUT .SLD SCALE 17 


Insulin Glargine,Hum.rec.anlog [Toujeo Solostar] 70 unit SQ QHS 17 


Lisinopril [Prinivil] 20 mg PO DAILY 17 


Loratadine [Claritin] 10 mg PO DAILY PRN 17 


Multivitamin [Multivitamins] 1 each PO DAILY 17 


Simvastatin [Zocor 20 mg Tablet] 20 mg PO QHS 17 


Sulfamethoxazole/Trimethoprim [Bactrim Ds Tablet] 1 each PO BID 5 Days  tablet 

17 


Doxycycline Hyclate 100 mg PO BID #14 capsule 17 


Oxycodone HCl/Acetaminophen [Percocet 5-325 mg Tablet] 1 - 2 tab PO Q4H PRN #15 

tablet 17 


Fluconazole [Diflucan] 150 mg PO DAILY #2 tablet 17 


Metronidazole [Flagyl 500 mg Tablet] 500 mg PO BID #14 tablet 17 








Allergies/Adverse Reactions: 


 





Penicillins Allergy (Verified 18 19:38)


 











Review of Systems


Constitutional: ABSENT: chills, fever(s), headache(s), weight gain, weight loss


Eyes: ABSENT: visual disturbances


Ears: ABSENT: hearing changes


Cardiovascular: ABSENT: chest pain, dyspnea on exertion, edema, orthropnea, 

palpitations


Respiratory: ABSENT: cough, hemoptysis


Gastrointestinal: ABSENT: abdominal pain, constipation, diarrhea, hematemesis, 

hematochezia, nausea, vomiting


Genitourinary: ABSENT: dysuria, hematuria


Musculoskeletal: PRESENT: other - Right arm pain


Integumentary: ABSENT: rash, wounds


Neurological: ABSENT: abnormal gait, abnormal speech, confusion, dizziness, 

focal weakness, syncope


Psychiatric: ABSENT: anxiety, depression, homidical ideation, suicidal ideation


Endocrine: ABSENT: cold intolerance, heat intolerance, menstrual abnormalities, 

polydipsia, polyuria


Hematologic/Lymphatic: ABSENT: easy bleeding, easy bruising, lymphadenopathy





Physical Exam


Vital Signs: 


 











Temp Pulse Resp BP Pulse Ox


 


 98.3 F   92   16   108/65   96 


 


 03/10/18 07:20  03/10/18 07:42  03/10/18 07:20  03/10/18 07:20  03/10/18 07:20








 Intake & Output











 03/09/18 03/10/18 03/11/18





 06:59 06:59 07:59


 


Intake Total   125


 


Output Total   125


 


Balance   0


 


Weight  127.6 kg 











General appearance: PRESENT: no acute distress, well-developed, well-nourished


Head exam: PRESENT: atraumatic, normocephalic


Eye exam: PRESENT: conjunctiva pink, EOMI, PERRLA


Ear exam: PRESENT: normal external ear exam


Mouth exam: PRESENT: moist, tongue midline


Neck exam: PRESENT: full ROM


Respiratory exam: PRESENT: clear to auscultation van


Cardiovascular exam: PRESENT: RRR, +S1, +S2


Pulses: PRESENT: normal dorsalis pedis pul, +2 pedal pulses bilateral


Vascular exam: PRESENT: normal capillary refill


GI/Abdominal exam: PRESENT: normal bowel sounds, soft


Rectal exam: PRESENT: deferred


Extremities exam: PRESENT: other - She has right arm tenderness, swelling, 

limitation of range of motion


Neurological exam: PRESENT: alert, awake, oriented to person, oriented to place

, oriented to time, oriented to situation, CN II-XII grossly intact


Psychiatric exam: PRESENT: appropriate affect, normal mood


Skin exam: PRESENT: dry, intact, warm





Results


Laboratory Results: 


 











  03/10/18 03/10/18 03/10/18





  03:28 03:28 03:28


 


Phosphorus  4.1  


 


Magnesium  1.6  


 


Ammonia    < 8.7 L


 


TSH   0.87 


 


Free T4   1.39 








 











  03/10/18 03/10/18 03/10/18





  03:28 03:28 09:14


 


Creatine Kinase  < 20 L   < 20 L


 


CK-MB (CK-2)   < 0.22 


 


Troponin I   < 0.012 














  03/10/18





  09:14


 


Creatine Kinase 


 


CK-MB (CK-2)  < 0.22


 


Troponin I  < 0.012











Impressions: 


 





Forearm X-Ray  18 21:36


IMPRESSION:  No fracture.


 














Assessment & Plan





- Diagnosis


(1) Abscess of left arm


Plan: 


Continue IV antibiotic clindamycin, 600 mg IV every 6








(2) Diabetes mellitus


Qualifiers: 


   Diabetes mellitus type: type 2   Diabetes mellitus complication status: with 

neurologic complications   Diabetes mellitus complication detail: with 

polyneuropathy 


Is this a current diagnosis for this admission?: Yes

## 2018-03-10 NOTE — OPERATIVE REPORT
Operative Report


DATE OF SURGERY: 03/10/18


PREOPERATIVE DIAGNOSIS: Right arm abscess antecubital fossa


POSTOPERATIVE DIAGNOSIS: Complex, superficial and deep abscess involving the 

antecubital fossa skin subcutaneous tissue fascia and muscle


OPERATION: Exploration of antecubital fossa, skin and soft tissue debridement, 

wound irrigation and packing


SURGEON: GAIL CADE


ANESTHESIA: Other - Combination axillary  blot, LMAC


TISSUE REMOVED OR ALTERED: Pus dead skin and fat


COMPLICATIONS: 





None


ESTIMATED BLOOD LOSS: Scant


INTRAOPERATIVE FINDINGS: See below


PROCEDURE: 





She then had a right arm block installed Dr. Adkins.  Patient taken to the 

operating room where heavy LMAC sedation was induced.  Right arm was abducted 

prepped draped sterile fashion





Surgical plan surgical timeout were reviewed





Appropriate level of anesthesia was felt to have been achieved.  A 6 cm long 

incision was made over the skin crease at the center of the antecubital fossa 

pus was evacuated from the subcutaneous tissue, the antecubital fossa going 

down to the confluence  of the biceps brachii fascia.  The fascia had been 

disrupted by the infection and so my finger was used to break up loculations at 

the recess of this apartment.  There was limited extension of the infection 

cephalad, and essentially no extension  distally towards the wrist at all 

loculations were broken up wound irrigated with a liter saline and packed with 

1 inch iodoform packing, approximately 1/2 feet.  Final operative wound at the 

dimensions of 6 cm x 3 cm, elliptical shaped.





Tolerated procedure well taken recovery room stable condition.

## 2018-03-11 LAB
ADD MANUAL DIFF: NO
ALBUMIN SERPL-MCNC: 2.6 G/DL (ref 3.5–5)
ALP SERPL-CCNC: 79 U/L (ref 38–126)
ALT SERPL-CCNC: 48 U/L (ref 9–52)
ANION GAP SERPL CALC-SCNC: 7 MMOL/L (ref 5–19)
AST SERPL-CCNC: 34 U/L (ref 14–36)
BASOPHILS # BLD AUTO: 0.1 10^3/UL (ref 0–0.2)
BASOPHILS NFR BLD AUTO: 0.6 % (ref 0–2)
BILIRUB DIRECT SERPL-MCNC: 0.1 MG/DL (ref 0–0.4)
BILIRUB SERPL-MCNC: 0.5 MG/DL (ref 0.2–1.3)
BUN SERPL-MCNC: 13 MG/DL (ref 7–20)
CALCIUM: 8.3 MG/DL (ref 8.4–10.2)
CHLORIDE SERPL-SCNC: 105 MMOL/L (ref 98–107)
CHOLEST SERPL-MCNC: 111.15 MG/DL (ref 0–200)
CO2 SERPL-SCNC: 24 MMOL/L (ref 22–30)
EOSINOPHIL # BLD AUTO: 0.2 10^3/UL (ref 0–0.6)
EOSINOPHIL NFR BLD AUTO: 2.2 % (ref 0–6)
ERYTHROCYTE [DISTWIDTH] IN BLOOD BY AUTOMATED COUNT: 13.8 % (ref 11.5–14)
GLUCOSE SERPL-MCNC: 233 MG/DL (ref 75–110)
HCT VFR BLD CALC: 34.5 % (ref 36–47)
HGB BLD-MCNC: 11.9 G/DL (ref 12–15.5)
LDLC SERPL DIRECT ASSAY-MCNC: 82 MG/DL (ref ?–100)
LYMPHOCYTES # BLD AUTO: 2.1 10^3/UL (ref 0.5–4.7)
LYMPHOCYTES NFR BLD AUTO: 23.3 % (ref 13–45)
MCH RBC QN AUTO: 31 PG (ref 27–33.4)
MCHC RBC AUTO-ENTMCNC: 34.5 G/DL (ref 32–36)
MCV RBC AUTO: 90 FL (ref 80–97)
MONOCYTES # BLD AUTO: 0.6 10^3/UL (ref 0.1–1.4)
MONOCYTES NFR BLD AUTO: 6.8 % (ref 3–13)
NEUTROPHILS # BLD AUTO: 6.1 10^3/UL (ref 1.7–8.2)
NEUTS SEG NFR BLD AUTO: 67.1 % (ref 42–78)
PLATELET # BLD: 244 10^3/UL (ref 150–450)
POTASSIUM SERPL-SCNC: 4.1 MMOL/L (ref 3.6–5)
PROT SERPL-MCNC: 4.8 G/DL (ref 6.3–8.2)
RBC # BLD AUTO: 3.84 10^6/UL (ref 3.72–5.28)
SODIUM SERPL-SCNC: 135.9 MMOL/L (ref 137–145)
TOTAL CELLS COUNTED % (AUTO): 100 %
TRIGL SERPL-MCNC: 98 MG/DL (ref ?–150)
VLDLC SERPL CALC-MCNC: 20 MG/DL (ref 10–31)
WBC # BLD AUTO: 9 10^3/UL (ref 4–10.5)

## 2018-03-11 RX ADMIN — INSULIN LISPRO PRN UNIT: 100 INJECTION, SOLUTION INTRAVENOUS; SUBCUTANEOUS at 16:58

## 2018-03-11 RX ADMIN — SIMVASTATIN SCH MG: 10 TABLET, FILM COATED ORAL at 21:12

## 2018-03-11 RX ADMIN — DOCUSATE SODIUM SCH MG: 100 CAPSULE, LIQUID FILLED ORAL at 10:19

## 2018-03-11 RX ADMIN — CLINDAMYCIN PHOSPHATE SCH ML: 12 INJECTION, SOLUTION INTRAVENOUS at 14:14

## 2018-03-11 RX ADMIN — KETOROLAC TROMETHAMINE SCH MG: 30 INJECTION, SOLUTION INTRAMUSCULAR at 14:13

## 2018-03-11 RX ADMIN — DOCUSATE SODIUM SCH MG: 100 CAPSULE, LIQUID FILLED ORAL at 17:00

## 2018-03-11 RX ADMIN — KETOROLAC TROMETHAMINE SCH MG: 30 INJECTION, SOLUTION INTRAMUSCULAR at 21:12

## 2018-03-11 RX ADMIN — TRAZODONE HYDROCHLORIDE SCH MG: 50 TABLET ORAL at 21:12

## 2018-03-11 RX ADMIN — SODIUM CHLORIDE PRN ML: 9 INJECTION, SOLUTION INTRAVENOUS at 16:48

## 2018-03-11 RX ADMIN — INSULIN LISPRO PRN UNIT: 100 INJECTION, SOLUTION INTRAVENOUS; SUBCUTANEOUS at 21:12

## 2018-03-11 RX ADMIN — KETOROLAC TROMETHAMINE SCH MG: 30 INJECTION, SOLUTION INTRAMUSCULAR at 05:45

## 2018-03-11 RX ADMIN — KETOROLAC TROMETHAMINE PRN MG: 30 INJECTION, SOLUTION INTRAMUSCULAR at 16:47

## 2018-03-11 RX ADMIN — CLINDAMYCIN PHOSPHATE SCH ML: 12 INJECTION, SOLUTION INTRAVENOUS at 05:45

## 2018-03-11 RX ADMIN — INSULIN LISPRO PRN UNIT: 100 INJECTION, SOLUTION INTRAVENOUS; SUBCUTANEOUS at 08:42

## 2018-03-11 RX ADMIN — CLINDAMYCIN PHOSPHATE SCH ML: 12 INJECTION, SOLUTION INTRAVENOUS at 21:12

## 2018-03-11 RX ADMIN — INSULIN LISPRO PRN UNIT: 100 INJECTION, SOLUTION INTRAVENOUS; SUBCUTANEOUS at 13:14

## 2018-03-11 RX ADMIN — GABAPENTIN SCH MG: 400 CAPSULE ORAL at 21:12

## 2018-03-11 NOTE — PDOC PROGRESS REPORT
Subjective


Progress Note for:: 03/11/18


Subjective:: 





She was admitted yesterday for management of abscess of the right arm , status 

post incision and drainage


Reason For Visit: 


CELLULITIS








Physical Exam


Vital Signs: 


 











Temp Pulse Resp BP Pulse Ox


 


 97.5 F   85   16   143/91 H  100 


 


 03/11/18 12:35  03/11/18 12:35  03/11/18 12:35  03/11/18 12:35  03/11/18 12:35








 Intake & Output











 03/10/18 03/11/18 03/12/18





 05:59 06:59 06:59


 


Intake Total   


 


Output Total   


 


Balance   


 


Weight   











General appearance: PRESENT: no acute distress


Eye exam: PRESENT: PERRLA


Respiratory exam: PRESENT: clear to auscultation van


Cardiovascular exam: PRESENT: +S1, +S2


GI/Abdominal exam: PRESENT: soft


Neurological exam: PRESENT: alert





Results


Laboratory Results: 


 





 03/11/18 06:20 





 03/11/18 06:20 





 











  03/11/18 03/11/18





  06:20 06:20


 


WBC  9.0 


 


RBC  3.84 


 


Hgb  11.9 L D 


 


Hct  34.5 L 


 


MCV  90 


 


MCH  31.0 


 


MCHC  34.5 


 


RDW  13.8 


 


Plt Count  244 


 


Seg Neutrophils %  67.1 


 


Lymphocytes %  23.3 


 


Monocytes %  6.8 


 


Eosinophils %  2.2 


 


Basophils %  0.6 


 


Absolute Neutrophils  6.1 


 


Absolute Lymphocytes  2.1 


 


Absolute Monocytes  0.6 


 


Absolute Eosinophils  0.2 


 


Absolute Basophils  0.1 


 


Sodium   135.9 L


 


Potassium   4.1


 


Chloride   105


 


Carbon Dioxide   24


 


Anion Gap   7


 


BUN   13


 


Creatinine   0.50 L


 


Est GFR ( Amer)   > 60


 


Est GFR (Non-Af Amer)   > 60


 


Glucose   233 H


 


Calcium   8.3 L


 


Total Bilirubin   0.5


 


AST   34


 


ALT   48


 


Alkaline Phosphatase   79


 


Total Protein   4.8 L


 


Albumin   2.6 L


 


Triglycerides   98


 


Cholesterol   111.15


 


LDL Cholesterol Direct   82


 


VLDL Cholesterol   20.0


 


HDL Cholesterol   23 L








 











  03/10/18 03/10/18 03/10/18





  03:28 03:28 09:14


 


Creatine Kinase  < 20 L   < 20 L


 


CK-MB (CK-2)   < 0.22 


 


Troponin I   < 0.012 














  03/10/18 03/10/18 03/10/18





  09:14 15:20 15:20


 


Creatine Kinase   < 20 L 


 


CK-MB (CK-2)  < 0.22   < 0.22


 


Troponin I  < 0.012   < 0.012











Impressions: 


 





Forearm X-Ray  03/09/18 21:36


IMPRESSION:  No fracture.


 














Assessment & Plan





- Diagnosis


(1) Abscess of left arm


Is this a current diagnosis for this admission?: Yes   





(2) Diabetes mellitus


Qualifiers: 


   Diabetes mellitus type: type 2   Diabetes mellitus complication status: with 

neurologic complications   Diabetes mellitus complication detail: with 

polyneuropathy 


Is this a current diagnosis for this admission?: Yes   





(3) Cocaine abuse


Is this a current diagnosis for this admission?: Yes   





- Plan Summary


Plan Summary: 





Continue IV antibiotic

## 2018-03-11 NOTE — PDOC PROGRESS REPORT
Subjective


Progress Note for:: 03/11/18


Reason For Visit: 


CELLULITIS


Patient feels better.





Physical Exam


Vital Signs: 


 











Temp Pulse Resp BP Pulse Ox


 


 97.6 F   83   12   113/64   98 


 


 03/11/18 07:52  03/11/18 07:52  03/11/18 07:52  03/11/18 07:52  03/11/18 07:52








 Intake & Output











 03/10/18 03/11/18 03/12/18





 05:59 06:59 06:59


 


Intake Total   


 


Output Total   


 


Balance   


 


Weight   











General appearance: PRESENT: no acute distress


Musculoskeletal exam: PRESENT: other - Right upper extremity examined.  Range 

of motion of wrist hand and arm preserved.  Dressing change performed by 

nursing staff with removal of packing, wound irrigation and repacking.





Results


Laboratory Results: 


 





 03/11/18 06:20 





 03/11/18 06:20 





 











  03/11/18 03/11/18





  06:20 06:20


 


WBC  9.0 


 


RBC  3.84 


 


Hgb  11.9 L D 


 


Hct  34.5 L 


 


MCV  90 


 


MCH  31.0 


 


MCHC  34.5 


 


RDW  13.8 


 


Plt Count  244 


 


Seg Neutrophils %  67.1 


 


Lymphocytes %  23.3 


 


Monocytes %  6.8 


 


Eosinophils %  2.2 


 


Basophils %  0.6 


 


Absolute Neutrophils  6.1 


 


Absolute Lymphocytes  2.1 


 


Absolute Monocytes  0.6 


 


Absolute Eosinophils  0.2 


 


Absolute Basophils  0.1 


 


Sodium   135.9 L


 


Potassium   4.1


 


Chloride   105


 


Carbon Dioxide   24


 


Anion Gap   7


 


BUN   13


 


Creatinine   0.50 L


 


Est GFR ( Amer)   > 60


 


Est GFR (Non-Af Amer)   > 60


 


Glucose   233 H


 


Calcium   8.3 L


 


Total Bilirubin   0.5


 


AST   34


 


ALT   48


 


Alkaline Phosphatase   79


 


Total Protein   4.8 L


 


Albumin   2.6 L


 


Triglycerides   98


 


Cholesterol   111.15


 


LDL Cholesterol Direct   82


 


VLDL Cholesterol   20.0


 


HDL Cholesterol   23 L








 











  03/10/18 03/10/18 03/10/18





  03:28 03:28 09:14


 


Creatine Kinase  < 20 L   < 20 L


 


CK-MB (CK-2)   < 0.22 


 


Troponin I   < 0.012 














  03/10/18 03/10/18 03/10/18





  09:14 15:20 15:20


 


Creatine Kinase   < 20 L 


 


CK-MB (CK-2)  < 0.22   < 0.22


 


Troponin I  < 0.012   < 0.012











Impressions: 


 





Forearm X-Ray  03/09/18 21:36


IMPRESSION:  No fracture.


 














Assessment & Plan





- Diagnosis


(1) Abscess of left arm


Is this a current diagnosis for this admission?: Yes   


Plan: 


She is one day status post I&D right arm with excellent early results; cultures 

pending








Plan:





1.  Daily dressing changes; orders written





2.  Transition to p.o. antibiotics to cover gram-positive organisms. patient 

may be discharged home with daily dressing changes once stable from a medical 

standpoint, follow up with [facility] in 1-2 weeks.

## 2018-03-12 LAB
ADD MANUAL DIFF: NO
ALBUMIN SERPL-MCNC: 2.5 G/DL (ref 3.5–5)
ALP SERPL-CCNC: 83 U/L (ref 38–126)
ALT SERPL-CCNC: 62 U/L (ref 9–52)
ANION GAP SERPL CALC-SCNC: 6 MMOL/L (ref 5–19)
AST SERPL-CCNC: 60 U/L (ref 14–36)
BASOPHILS # BLD AUTO: 0.1 10^3/UL (ref 0–0.2)
BASOPHILS NFR BLD AUTO: 1.3 % (ref 0–2)
BILIRUB DIRECT SERPL-MCNC: 0.4 MG/DL (ref 0–0.4)
BILIRUB SERPL-MCNC: 0.4 MG/DL (ref 0.2–1.3)
BUN SERPL-MCNC: 13 MG/DL (ref 7–20)
CALCIUM: 8.1 MG/DL (ref 8.4–10.2)
CHLORIDE SERPL-SCNC: 109 MMOL/L (ref 98–107)
CO2 SERPL-SCNC: 23 MMOL/L (ref 22–30)
EOSINOPHIL # BLD AUTO: 0.4 10^3/UL (ref 0–0.6)
EOSINOPHIL NFR BLD AUTO: 5.5 % (ref 0–6)
ERYTHROCYTE [DISTWIDTH] IN BLOOD BY AUTOMATED COUNT: 13.9 % (ref 11.5–14)
GLUCOSE SERPL-MCNC: 227 MG/DL (ref 75–110)
HCT VFR BLD CALC: 34.7 % (ref 36–47)
HGB BLD-MCNC: 11.8 G/DL (ref 12–15.5)
LYMPHOCYTES # BLD AUTO: 2.7 10^3/UL (ref 0.5–4.7)
LYMPHOCYTES NFR BLD AUTO: 37.4 % (ref 13–45)
MCH RBC QN AUTO: 30.7 PG (ref 27–33.4)
MCHC RBC AUTO-ENTMCNC: 34 G/DL (ref 32–36)
MCV RBC AUTO: 90 FL (ref 80–97)
MONOCYTES # BLD AUTO: 0.5 10^3/UL (ref 0.1–1.4)
MONOCYTES NFR BLD AUTO: 6.3 % (ref 3–13)
NEUTROPHILS # BLD AUTO: 3.6 10^3/UL (ref 1.7–8.2)
NEUTS SEG NFR BLD AUTO: 49.5 % (ref 42–78)
PLATELET # BLD: 262 10^3/UL (ref 150–450)
POTASSIUM SERPL-SCNC: 4.4 MMOL/L (ref 3.6–5)
PROT SERPL-MCNC: 5.2 G/DL (ref 6.3–8.2)
RBC # BLD AUTO: 3.83 10^6/UL (ref 3.72–5.28)
SODIUM SERPL-SCNC: 137.6 MMOL/L (ref 137–145)
TOTAL CELLS COUNTED % (AUTO): 100 %
WBC # BLD AUTO: 7.3 10^3/UL (ref 4–10.5)

## 2018-03-12 RX ADMIN — KETOROLAC TROMETHAMINE PRN MG: 30 INJECTION, SOLUTION INTRAMUSCULAR at 03:33

## 2018-03-12 RX ADMIN — GABAPENTIN SCH MG: 400 CAPSULE ORAL at 21:22

## 2018-03-12 RX ADMIN — INSULIN LISPRO PRN UNIT: 100 INJECTION, SOLUTION INTRAVENOUS; SUBCUTANEOUS at 09:11

## 2018-03-12 RX ADMIN — GABAPENTIN SCH MG: 400 CAPSULE ORAL at 14:47

## 2018-03-12 RX ADMIN — INSULIN LISPRO PRN UNIT: 100 INJECTION, SOLUTION INTRAVENOUS; SUBCUTANEOUS at 11:58

## 2018-03-12 RX ADMIN — INSULIN LISPRO PRN UNIT: 100 INJECTION, SOLUTION INTRAVENOUS; SUBCUTANEOUS at 23:52

## 2018-03-12 RX ADMIN — TRAZODONE HYDROCHLORIDE SCH MG: 50 TABLET ORAL at 21:22

## 2018-03-12 RX ADMIN — DOCUSATE SODIUM SCH MG: 100 CAPSULE, LIQUID FILLED ORAL at 09:13

## 2018-03-12 RX ADMIN — CLINDAMYCIN PHOSPHATE SCH ML: 12 INJECTION, SOLUTION INTRAVENOUS at 14:47

## 2018-03-12 RX ADMIN — KETOROLAC TROMETHAMINE PRN MG: 30 INJECTION, SOLUTION INTRAMUSCULAR at 09:22

## 2018-03-12 RX ADMIN — LISINOPRIL SCH MG: 10 TABLET ORAL at 09:12

## 2018-03-12 RX ADMIN — FENTANYL CITRATE PRN MCG: 50 INJECTION INTRAMUSCULAR; INTRAVENOUS at 06:37

## 2018-03-12 RX ADMIN — GABAPENTIN SCH MG: 400 CAPSULE ORAL at 06:37

## 2018-03-12 RX ADMIN — FENTANYL CITRATE PRN MCG: 50 INJECTION INTRAMUSCULAR; INTRAVENOUS at 14:47

## 2018-03-12 RX ADMIN — DOCUSATE SODIUM SCH MG: 100 CAPSULE, LIQUID FILLED ORAL at 18:50

## 2018-03-12 RX ADMIN — CLINDAMYCIN PHOSPHATE SCH ML: 12 INJECTION, SOLUTION INTRAVENOUS at 21:22

## 2018-03-12 RX ADMIN — SIMVASTATIN SCH MG: 10 TABLET, FILM COATED ORAL at 21:22

## 2018-03-12 RX ADMIN — MULTIVITAMIN TABLET SCH TAB: TABLET at 09:12

## 2018-03-12 RX ADMIN — CLINDAMYCIN PHOSPHATE SCH ML: 12 INJECTION, SOLUTION INTRAVENOUS at 06:37

## 2018-03-12 RX ADMIN — KETOROLAC TROMETHAMINE SCH MG: 30 INJECTION, SOLUTION INTRAMUSCULAR at 14:48

## 2018-03-12 RX ADMIN — ESCITALOPRAM OXALATE SCH MG: 10 TABLET, FILM COATED ORAL at 09:12

## 2018-03-12 RX ADMIN — INSULIN LISPRO PRN UNIT: 100 INJECTION, SOLUTION INTRAVENOUS; SUBCUTANEOUS at 18:51

## 2018-03-12 RX ADMIN — KETOROLAC TROMETHAMINE SCH MG: 30 INJECTION, SOLUTION INTRAMUSCULAR at 06:38

## 2018-03-12 RX ADMIN — KETOROLAC TROMETHAMINE SCH MG: 30 INJECTION, SOLUTION INTRAMUSCULAR at 21:22

## 2018-03-12 NOTE — PDOC PROGRESS REPORT
Subjective


Progress Note for:: 03/12/18


Subjective:: 





No fever or chills. No nausea or vomiting. No chest pain or difficulty with 

breathing. 


Reason For Visit: 


CELLULITIS








Physical Exam


Vital Signs: 


 











Temp Pulse Resp BP Pulse Ox


 


 98.5 F   78   20   138/75 H  97 


 


 03/12/18 15:31  03/12/18 15:31  03/12/18 15:31  03/12/18 15:31  03/12/18 15:31








 Intake & Output











 03/11/18 03/12/18 03/13/18





 06:59 06:59 06:59


 


Intake Total  2610 621


 


Output Total   


 


Balance  2610 621


 


Weight  132.9 kg 132.9 kg











General appearance: PRESENT: no acute distress, morbidly obese


Head exam: PRESENT: atraumatic, normocephalic


Eye exam: PRESENT: conjunctiva pink, EOMI, PERRLA.  ABSENT: scleral icterus


Mouth exam: PRESENT: moist


Respiratory exam: PRESENT: clear to auscultation van


Cardiovascular exam: PRESENT: RRR.  ABSENT: diastolic murmur, rubs, systolic 

murmur


Vascular exam: PRESENT: normal capillary refill.  ABSENT: pallor


GI/Abdominal exam: PRESENT: normal bowel sounds, soft.  ABSENT: distended, 

guarding, mass, organolmegaly, rebound, tenderness


Musculoskeletal exam: PRESENT: normal inspection


Neurological exam: PRESENT: alert, awake, oriented to person, oriented to place

, oriented to time, oriented to situation, CN II-XII grossly intact.  ABSENT: 

motor sensory deficit


Psychiatric exam: PRESENT: appropriate affect, normal mood.  ABSENT: homicidal 

ideation, suicidal ideation


Skin exam: PRESENT: dry, intact - I&D site right cubital fossa region with 

penrose packing. No bleeding or discharge. Wound looked fairly clean., warm





Results


Laboratory Results: 


 





 03/12/18 06:36 





 03/12/18 06:36 





 











  03/12/18 03/12/18





  06:36 06:36


 


WBC  7.3 


 


RBC  3.83 


 


Hgb  11.8 L 


 


Hct  34.7 L 


 


MCV  90 


 


MCH  30.7 


 


MCHC  34.0 


 


RDW  13.9 


 


Plt Count  262 


 


Seg Neutrophils %  49.5 


 


Lymphocytes %  37.4 


 


Monocytes %  6.3 


 


Eosinophils %  5.5 


 


Basophils %  1.3 


 


Absolute Neutrophils  3.6 


 


Absolute Lymphocytes  2.7 


 


Absolute Monocytes  0.5 


 


Absolute Eosinophils  0.4 


 


Absolute Basophils  0.1 


 


Sodium   137.6


 


Potassium   4.4


 


Chloride   109 H


 


Carbon Dioxide   23


 


Anion Gap   6


 


BUN   13


 


Creatinine   0.52


 


Est GFR ( Amer)   > 60


 


Est GFR (Non-Af Amer)   > 60


 


Glucose   227 H


 


Calcium   8.1 L


 


Total Bilirubin   0.4


 


AST   60 H


 


ALT   62 H


 


Alkaline Phosphatase   83


 


Total Protein   5.2 L


 


Albumin   2.5 L








 











  03/10/18 03/10/18 03/10/18





  03:28 03:28 09:14


 


Creatine Kinase  < 20 L   < 20 L


 


CK-MB (CK-2)   < 0.22 


 


Troponin I   < 0.012 














  03/10/18 03/10/18 03/10/18





  09:14 15:20 15:20


 


Creatine Kinase   < 20 L 


 


CK-MB (CK-2)  < 0.22   < 0.22


 


Troponin I  < 0.012   < 0.012











Impressions: 


 





Forearm X-Ray  03/09/18 21:36


IMPRESSION:  No fracture.


 














Assessment & Plan





- Diagnosis


(1) Cellulitis and abscess of upper arm and forearm


Is this a current diagnosis for this admission?: Yes   


Plan: 


Continue IV Cleocin coverage in view of resolution of her leukocytosis and 

wound culture findings. Consider transition to oral route with sensitivity 

report.








(2) Diabetes mellitus


Qualifiers: 


   Diabetes mellitus type: type 2   Diabetes mellitus complication status: with 

neurologic complications   Diabetes mellitus complication detail: with 

polyneuropathy 


Is this a current diagnosis for this admission?: Yes   


Plan: 


See attending physician orders.








(3) Morbid obesity with BMI of 50.0-59.9, adult


Is this a current diagnosis for this admission?: Yes   


Plan: 


See attending physician orders.








(4) Cocaine abuse


Is this a current diagnosis for this admission?: Yes   


Plan: 


See attending physician orders.








- Time


Time Spent with patient: 25-34 minutes


Medications reviewed and adjusted accordingly: Yes


Anticipated discharge: Home with Homehealth


Within: within 48 hours





- Inpatient Certification


Based on my medical assessment, after consideration of the patient's 

comorbidities, presenting symptoms, or acuity I expect that the services needed 

warrant INPATIENT care.: Yes


I certify that my determination is in accordance with my understanding of 

Medicare's requirements for reasonable and necessary INPATIENT services [42 CFR 

412.3e].: Yes


Medical Necessity: Need Close Monitoring Due to Risk of Patient Decompensation, 

Need For Continuous Telemetry Monitoring, Need for IV Antibiotics, Risk of 

Complication if Not Cared For in Hospital


Post Hospital Care: D/C Planner Documentation





- Plan Summary


Plan Summary: 





See attending physician orders.

## 2018-03-13 LAB
ADD MANUAL DIFF: NO
ALBUMIN SERPL-MCNC: 2.7 G/DL (ref 3.5–5)
ALP SERPL-CCNC: 103 U/L (ref 38–126)
ALT SERPL-CCNC: 53 U/L (ref 9–52)
ANION GAP SERPL CALC-SCNC: 10 MMOL/L (ref 5–19)
AST SERPL-CCNC: 33 U/L (ref 14–36)
BASOPHILS # BLD AUTO: 0.1 10^3/UL (ref 0–0.2)
BASOPHILS NFR BLD AUTO: 0.8 % (ref 0–2)
BILIRUB DIRECT SERPL-MCNC: 0.1 MG/DL (ref 0–0.4)
BILIRUB SERPL-MCNC: 0.2 MG/DL (ref 0.2–1.3)
BUN SERPL-MCNC: 12 MG/DL (ref 7–20)
CALCIUM: 8.6 MG/DL (ref 8.4–10.2)
CHLORIDE SERPL-SCNC: 102 MMOL/L (ref 98–107)
CO2 SERPL-SCNC: 26 MMOL/L (ref 22–30)
EOSINOPHIL # BLD AUTO: 0.5 10^3/UL (ref 0–0.6)
EOSINOPHIL NFR BLD AUTO: 6 % (ref 0–6)
ERYTHROCYTE [DISTWIDTH] IN BLOOD BY AUTOMATED COUNT: 13.9 % (ref 11.5–14)
GLUCOSE SERPL-MCNC: 216 MG/DL (ref 75–110)
HCT VFR BLD CALC: 36.2 % (ref 36–47)
HGB BLD-MCNC: 12.1 G/DL (ref 12–15.5)
LYMPHOCYTES # BLD AUTO: 2.8 10^3/UL (ref 0.5–4.7)
LYMPHOCYTES NFR BLD AUTO: 36 % (ref 13–45)
MCH RBC QN AUTO: 30.4 PG (ref 27–33.4)
MCHC RBC AUTO-ENTMCNC: 33.4 G/DL (ref 32–36)
MCV RBC AUTO: 91 FL (ref 80–97)
MONOCYTES # BLD AUTO: 0.6 10^3/UL (ref 0.1–1.4)
MONOCYTES NFR BLD AUTO: 7.1 % (ref 3–13)
NEUTROPHILS # BLD AUTO: 3.9 10^3/UL (ref 1.7–8.2)
NEUTS SEG NFR BLD AUTO: 50.1 % (ref 42–78)
PLATELET # BLD: 278 10^3/UL (ref 150–450)
POTASSIUM SERPL-SCNC: 4.1 MMOL/L (ref 3.6–5)
PROT SERPL-MCNC: 5 G/DL (ref 6.3–8.2)
RBC # BLD AUTO: 3.97 10^6/UL (ref 3.72–5.28)
SODIUM SERPL-SCNC: 137.6 MMOL/L (ref 137–145)
TOTAL CELLS COUNTED % (AUTO): 100 %
WBC # BLD AUTO: 7.8 10^3/UL (ref 4–10.5)

## 2018-03-13 RX ADMIN — MULTIVITAMIN TABLET SCH TAB: TABLET at 11:52

## 2018-03-13 RX ADMIN — CLINDAMYCIN PHOSPHATE SCH ML: 12 INJECTION, SOLUTION INTRAVENOUS at 05:49

## 2018-03-13 RX ADMIN — CLINDAMYCIN HYDROCHLORIDE SCH MG: 150 CAPSULE ORAL at 23:11

## 2018-03-13 RX ADMIN — INSULIN LISPRO PRN UNIT: 100 INJECTION, SOLUTION INTRAVENOUS; SUBCUTANEOUS at 23:11

## 2018-03-13 RX ADMIN — SIMVASTATIN SCH MG: 10 TABLET, FILM COATED ORAL at 23:10

## 2018-03-13 RX ADMIN — GABAPENTIN SCH MG: 400 CAPSULE ORAL at 23:10

## 2018-03-13 RX ADMIN — GABAPENTIN SCH MG: 400 CAPSULE ORAL at 15:30

## 2018-03-13 RX ADMIN — LISINOPRIL SCH MG: 10 TABLET ORAL at 11:52

## 2018-03-13 RX ADMIN — KETOROLAC TROMETHAMINE SCH MG: 30 INJECTION, SOLUTION INTRAMUSCULAR at 23:10

## 2018-03-13 RX ADMIN — KETOROLAC TROMETHAMINE SCH MG: 30 INJECTION, SOLUTION INTRAMUSCULAR at 15:30

## 2018-03-13 RX ADMIN — GABAPENTIN SCH MG: 400 CAPSULE ORAL at 05:49

## 2018-03-13 RX ADMIN — INSULIN LISPRO PRN UNIT: 100 INJECTION, SOLUTION INTRAVENOUS; SUBCUTANEOUS at 16:27

## 2018-03-13 RX ADMIN — DOCUSATE SODIUM SCH MG: 100 CAPSULE, LIQUID FILLED ORAL at 18:37

## 2018-03-13 RX ADMIN — TRAZODONE HYDROCHLORIDE SCH MG: 50 TABLET ORAL at 23:10

## 2018-03-13 RX ADMIN — ESCITALOPRAM OXALATE SCH MG: 10 TABLET, FILM COATED ORAL at 11:52

## 2018-03-13 RX ADMIN — CLINDAMYCIN PHOSPHATE SCH ML: 12 INJECTION, SOLUTION INTRAVENOUS at 15:30

## 2018-03-13 RX ADMIN — DOCUSATE SODIUM SCH MG: 100 CAPSULE, LIQUID FILLED ORAL at 11:52

## 2018-03-13 RX ADMIN — KETOROLAC TROMETHAMINE SCH MG: 30 INJECTION, SOLUTION INTRAMUSCULAR at 05:49

## 2018-03-13 NOTE — PDOC PROGRESS REPORT
Subjective


Progress Note for:: 03/13/18


Subjective:: 


Patient reported newly developing indurated lesion on dorsal aspect of right 

forearm. she claimed that her recently debrided lesion in the right cubital 

region started out this way. There is associated minimal pain to palpation. No 

significant warmth.  No fever or chills. No nausea or vomiting. No chest pain 

or difficulty with breathing. 


Reason For Visit: 


CELLULITIS








Physical Exam


Vital Signs: 


 











Temp Pulse Resp BP Pulse Ox


 


 97.9 F   71   20   113/90 H  96 


 


 03/13/18 16:14  03/13/18 16:14  03/13/18 11:54  03/13/18 16:14  03/13/18 16:14








 Intake & Output











 03/12/18 03/13/18 03/14/18





 06:59 06:59 06:59


 


Intake Total 2610 1391 934


 


Balance 2610 1391 934


 


Weight 132.9 kg 136 kg 











Physical Exam: 


General appearance: PRESENT: no acute distress, morbidly obese


Head exam: PRESENT: atraumatic, normocephalic


Eye exam: PRESENT: conjunctiva pink, EOMI, PERRLA.  ABSENT: scleral icterus


Mouth exam: PRESENT: moist


Respiratory exam: PRESENT: clear to auscultation van


Cardiovascular exam: PRESENT: RRR.  ABSENT: diastolic murmur, rubs, systolic 

murmur


Vascular exam: PRESENT: normal capillary refill.  ABSENT: pallor


GI/Abdominal exam: PRESENT: normal bowel sounds, soft.  ABSENT: distended, 

guarding, mass, organomegaly, rebound, tenderness


Musculoskeletal exam: PRESENT: normal inspection


Neurological exam: PRESENT: alert, awake, oriented to person, oriented to place

, oriented to time, oriented to situation, CN II-XII grossly intact.  ABSENT: 

motor sensory deficit


Psychiatric exam: PRESENT: appropriate affect, normal mood.  ABSENT: homicidal 

ideation, suicidal ideation


Skin exam: PRESENT: dry, intact - I&D site right cubital fossa region with 

penrose packing. No bleeding or discharge. Wound looked fairly clean., 

warm.There is an indurated lesion on the dorsal aspect of right forearm with 

minimal tenderness to palpation.





Results


Laboratory Results: 


 





 03/13/18 04:03 





 03/13/18 04:03 





 











  03/13/18 03/13/18





  04:03 04:03


 


WBC  7.8 


 


RBC  3.97 


 


Hgb  12.1 


 


Hct  36.2 


 


MCV  91 


 


MCH  30.4 


 


MCHC  33.4 


 


RDW  13.9 


 


Plt Count  278 


 


Seg Neutrophils %  50.1 


 


Lymphocytes %  36.0 


 


Monocytes %  7.1 


 


Eosinophils %  6.0 


 


Basophils %  0.8 


 


Absolute Neutrophils  3.9 


 


Absolute Lymphocytes  2.8 


 


Absolute Monocytes  0.6 


 


Absolute Eosinophils  0.5 


 


Absolute Basophils  0.1 


 


Sodium   137.6


 


Potassium   4.1


 


Chloride   102


 


Carbon Dioxide   26


 


Anion Gap   10


 


BUN   12


 


Creatinine   0.58


 


Est GFR ( Amer)   > 60


 


Est GFR (Non-Af Amer)   > 60


 


Glucose   216 H


 


Calcium   8.6


 


Total Bilirubin   0.2


 


AST   33


 


ALT   53 H


 


Alkaline Phosphatase   103


 


Total Protein   5.0 L


 


Albumin   2.7 L








 











  03/10/18 03/10/18 03/10/18





  03:28 03:28 09:14


 


Creatine Kinase  < 20 L   < 20 L


 


CK-MB (CK-2)   < 0.22 


 


Troponin I   < 0.012 














  03/10/18 03/10/18 03/10/18





  09:14 15:20 15:20


 


Creatine Kinase   < 20 L 


 


CK-MB (CK-2)  < 0.22   < 0.22


 


Troponin I  < 0.012   < 0.012











Impressions: 


 





Forearm X-Ray  03/09/18 21:36


IMPRESSION:  No fracture.


 














Assessment & Plan





- Diagnosis


(1) Cellulitis and abscess of upper arm and forearm


Is this a current diagnosis for this admission?: Yes   





(2) Diabetes mellitus


Qualifiers: 


   Diabetes mellitus type: type 2   Diabetes mellitus complication status: with 

neurologic complications   Diabetes mellitus complication detail: with 

polyneuropathy 


Is this a current diagnosis for this admission?: Yes   





(3) Morbid obesity with BMI of 50.0-59.9, adult


Is this a current diagnosis for this admission?: Yes   





(4) Cocaine abuse


Is this a current diagnosis for this admission?: Yes   





- Time


Time Spent with patient: 25-34 minutes


Medications reviewed and adjusted accordingly: Yes


Anticipated discharge: Home with Homehealth


Within: within 24 hours





- Inpatient Certification


Based on my medical assessment, after consideration of the patient's 

comorbidities, presenting symptoms, or acuity I expect that the services needed 

warrant INPATIENT care.: Yes


I certify that my determination is in accordance with my understanding of 

Medicare's requirements for reasonable and necessary INPATIENT services [42 CFR 

412.3e].: Yes


Medical Necessity: Need Close Monitoring Due to Risk of Patient Decompensation, 

Need For Continuous Telemetry Monitoring, Need for IV Antibiotics, Risk of 

Complication if Not Cared For in Hospital


Post Hospital Care: D/C Planner Documentation





- Plan Summary


Plan Summary: 





See attending physician orders. Increase Lantus Insulin to 80 units S.C daily.D/

C IV Cleocin. Start on Clindamycin 300mg p.o [id number] with stat dose now. If she 

remain afebrile consider discharge home tomorrow.

## 2018-03-14 VITALS — DIASTOLIC BLOOD PRESSURE: 82 MMHG | SYSTOLIC BLOOD PRESSURE: 140 MMHG

## 2018-03-14 RX ADMIN — MULTIVITAMIN TABLET SCH TAB: TABLET at 09:33

## 2018-03-14 RX ADMIN — INSULIN LISPRO PRN UNIT: 100 INJECTION, SOLUTION INTRAVENOUS; SUBCUTANEOUS at 09:32

## 2018-03-14 RX ADMIN — GABAPENTIN SCH MG: 400 CAPSULE ORAL at 06:01

## 2018-03-14 RX ADMIN — LISINOPRIL SCH MG: 10 TABLET ORAL at 09:33

## 2018-03-14 RX ADMIN — INSULIN LISPRO PRN UNIT: 100 INJECTION, SOLUTION INTRAVENOUS; SUBCUTANEOUS at 12:55

## 2018-03-14 RX ADMIN — GABAPENTIN SCH MG: 400 CAPSULE ORAL at 13:50

## 2018-03-14 RX ADMIN — CLINDAMYCIN HYDROCHLORIDE SCH MG: 150 CAPSULE ORAL at 06:01

## 2018-03-14 RX ADMIN — ESCITALOPRAM OXALATE SCH MG: 10 TABLET, FILM COATED ORAL at 09:34

## 2018-03-14 RX ADMIN — KETOROLAC TROMETHAMINE SCH MG: 30 INJECTION, SOLUTION INTRAMUSCULAR at 06:01

## 2018-03-14 RX ADMIN — DOCUSATE SODIUM SCH MG: 100 CAPSULE, LIQUID FILLED ORAL at 09:34

## 2018-03-14 RX ADMIN — INSULIN LISPRO PRN UNIT: 100 INJECTION, SOLUTION INTRAVENOUS; SUBCUTANEOUS at 17:35

## 2018-03-14 RX ADMIN — DOCUSATE SODIUM SCH MG: 100 CAPSULE, LIQUID FILLED ORAL at 17:35

## 2018-03-14 RX ADMIN — CLINDAMYCIN HYDROCHLORIDE SCH MG: 150 CAPSULE ORAL at 13:49

## 2018-03-14 RX ADMIN — KETOROLAC TROMETHAMINE SCH MG: 30 INJECTION, SOLUTION INTRAMUSCULAR at 13:45

## 2018-03-14 NOTE — PDOC DISCHARGE SUMMARY
General





- Admit/Disc Date/PCP


Admission Date/Primary Care Provider: 


  03/09/18 23:22





  SHELTON JOE





Discharge Date: 03/14/18





- Discharge Diagnosis


(1) Cellulitis and abscess of upper arm and forearm


Is this a current diagnosis for this admission?: Yes   





(2) Diabetes mellitus


Is this a current diagnosis for this admission?: Yes   





(3) Morbid obesity with BMI of 50.0-59.9, adult


Is this a current diagnosis for this admission?: Yes   





(4) Cocaine abuse


Is this a current diagnosis for this admission?: Yes   





- Additional Information


Resuscitation Status: Full Code


Prescriptions: 


Clindamycin HCl [Cleocin 150 mg Capsule] 300 mg PO Q8 #60 capsule


Insulin Glargine,Hum.rec.anlog [Toujeo Solostar] 120 unit SQ ASDIR #10 

insuln.pen


Home Medications: 








Escitalopram Oxalate [Lexapro 10 mg Tablet] 10 mg PO DAILY 03/11/18 


Gabapentin [Neurontin 400 mg Capsule] 400 mg PO Q8 03/11/18 


Insulin Aspart [Novolog Flexpen] 0 unit SUBCUT .SLD SCALE 03/11/18 


Lisinopril [Prinivil] 20 mg PO DAILY 03/11/18 


Loratadine [Claritin 10 mg Tablet] 10 mg PO DAILYP PRN 03/11/18 


Multivitamin [Multivitamins] 1 cap PO DAILY 03/11/18 


Simvastatin [Zocor 20 mg Tablet] 20 mg PO QHS 03/11/18 


Trazodone HCl [Desyrel 50 mg Tablet] 50 mg PO QHS 03/11/18 


Clindamycin HCl [Cleocin 150 mg Capsule] 300 mg PO Q8 #60 capsule 03/14/18 


Insulin Glargine,Hum.rec.anlog [Toujeo Solostar] 120 unit SQ ASDIR #10 

insuln.pen 03/14/18 











History of Present Illness


History of Present Illness: 


GONZALO MEEK is a 32 year old female with history of diabetes mellitus, she 

came to the emergency room for evaluation of swelling and pain of the right arm 

in the antecubital fossa, for the last 1 week.  The swelling was progressively 

getting bigger there was associated chills or fever, she could emergency room 

for evaluation there was associated leukocytosis.  She came to the emergency 

room last night, she was seen by the surgeon, she underwent incision and 

drainage this morning.  She was found to have complex superficial and deep 

abscess involving the antecubital fossa skin, subcutaneous tissue fascia muscle.








Hospital Course


Hospital Course: 


Patient was seen in consultation by the surgical team with incision and 

drainage of the right antecubital fossa abscess. Culture of the drainage grew 

multiple gram positive cocci. She developed a new right forearm indurated 

lesion. Her culture sensitivity identified clindamycin as most appropriate 

antibiotic therapy. Her diabetic mellitus remain significantly uncontrolled 

with hyperglycemia frequently above 250mg/dL. Her Toujeo insulin was increased 

to 80 units daily. She will be discharged home on a regimen of 80 units sc qam 

and 40 units sc qpm. If tolerated I will consider administering single dose in 

AM at office follow up visit. She will remain on Novolog Insulin premeal and HS 

sliding scale coverage. Her urine drug screen was positive for cocaine usage. 

She will be discharge home with [facility] for wound care. She will follow up with Dr Porter and myself as instructed upon discharge.





Physical Exam


Vital Signs: 


 











Temp Pulse Resp BP Pulse Ox


 


 97.9 F   84   18   132/85 H  99 


 


 03/14/18 15:50  03/14/18 15:50  03/14/18 15:50  03/14/18 15:50  03/14/18 15:50








 Intake & Output











 03/13/18 03/14/18 03/15/18





 06:59 06:59 06:59


 


Intake Total 1391 1619 


 


Balance 1391 1619 


 


Weight 136 kg 136.5 kg 











Physical Exam: 


General appearance: PRESENT: no acute distress, morbidly obese


Head exam: PRESENT: atraumatic, normocephalic


Eye exam: PRESENT: conjunctiva pink, EOMI, PERRLA.  ABSENT: scleral icterus


Mouth exam: PRESENT: moist


Respiratory exam: PRESENT: clear to auscultation van


Cardiovascular exam: PRESENT: RRR.  ABSENT: diastolic murmur, rubs, systolic 

murmur


Vascular exam: PRESENT: normal capillary refill.  ABSENT: pallor


GI/Abdominal exam: PRESENT: normal bowel sounds, soft.  ABSENT: distended, 

guarding, mass, organomegaly, rebound, tenderness


Musculoskeletal exam: PRESENT: normal inspection


Neurological exam: PRESENT: alert, awake, oriented to person, oriented to place

, oriented to time, oriented to situation, CN II-XII grossly intact.  ABSENT: 

motor sensory deficit


Psychiatric exam: PRESENT: appropriate affect, normal mood.  ABSENT: homicidal 

ideation, suicidal ideation


Skin exam: PRESENT: dry, intact - I&D site right cubital fossa region with 

penrose packing. No bleeding or discharge. Wound looked fairly clean., 

warm.There is an indurated lesion on the dorsal aspect of right forearm with 

minimal tenderness to palpation.








Results


Laboratory Results: 


 





 03/13/18 04:03 





 03/13/18 04:03 





 





03/10/18 11:14   Arm - Right Cellulitis   Gram Stain - Final


03/10/18 11:14   Arm - Right Cellulitis   Wound Culture - Final


                            Staphylococcus Aureus


                            Staphylococcus Epidermidis


                            Strep Anginosus Group


                            No Anaerobic Organisms





 











  03/10/18 03/10/18 03/10/18





  03:28 03:28 09:14


 


Creatine Kinase  < 20 L   < 20 L


 


CK-MB (CK-2)   < 0.22 


 


Troponin I   < 0.012 














  03/10/18 03/10/18 03/10/18





  09:14 15:20 15:20


 


Creatine Kinase   < 20 L 


 


CK-MB (CK-2)  < 0.22   < 0.22


 


Troponin I  < 0.012   < 0.012











Impressions: 


 





Forearm X-Ray  03/09/18 21:36


IMPRESSION:  No fracture.


 














Qualifiers





- *


**PATEINT BEING DISCHARGED WITH ANY OF THE FOLLOWING DIAGNOSIS?: No





Plan


Discharge Plan: 


Discharge home today with increase in her Lantus insulin to 80 units S.C qam 

and 40 units S.C qpm along with Novolog insulin premeal sliding scale. She will 

remain on Clindamycin 300 mg p.o q8 hours x 14 days. She will follow up with 

surgical teamand myself in office as directed upon discharge.

## 2018-04-09 ENCOUNTER — HOSPITAL ENCOUNTER (EMERGENCY)
Dept: HOSPITAL 62 - ER | Age: 33
LOS: 1 days | Discharge: ANOTHER HOSPITAL | End: 2018-04-10
Payer: COMMERCIAL

## 2018-04-09 DIAGNOSIS — R63.4: ICD-10-CM

## 2018-04-09 DIAGNOSIS — E11.9: ICD-10-CM

## 2018-04-09 DIAGNOSIS — R53.83: ICD-10-CM

## 2018-04-09 DIAGNOSIS — Z88.0: ICD-10-CM

## 2018-04-09 DIAGNOSIS — R00.0: ICD-10-CM

## 2018-04-09 DIAGNOSIS — K72.00: Primary | ICD-10-CM

## 2018-04-09 DIAGNOSIS — F17.200: ICD-10-CM

## 2018-04-09 DIAGNOSIS — R11.2: ICD-10-CM

## 2018-04-09 DIAGNOSIS — Z90.49: ICD-10-CM

## 2018-04-09 DIAGNOSIS — E66.9: ICD-10-CM

## 2018-04-09 LAB
ABSOLUTE LYMPHOCYTES# (MANUAL): 3.6 10^3/UL (ref 0.5–4.7)
ABSOLUTE MONOCYTES # (MANUAL): 0.8 10^3/UL (ref 0.1–1.4)
ABSOLUTE NEUTROPHILS# (MANUAL): 5.6 10^3/UL (ref 1.7–8.2)
ADD MANUAL DIFF: YES
ALBUMIN SERPL-MCNC: 3.8 G/DL (ref 3.5–5)
ALP SERPL-CCNC: 389 U/L (ref 38–126)
ALT SERPL-CCNC: 677 U/L (ref 9–52)
ANION GAP SERPL CALC-SCNC: 12 MMOL/L (ref 5–19)
ANISOCYTOSIS BLD QL SMEAR: SLIGHT
APAP SERPL-MCNC: < 10 UG/ML (ref 10–30)
APPEARANCE UR: (no result)
APTT BLD: 30.9 SEC (ref 23.5–35.8)
APTT PPP: (no result) S
AST SERPL-CCNC: 724 U/L (ref 14–36)
BASOPHILS NFR BLD MANUAL: 1 % (ref 0–2)
BILIRUB DIRECT SERPL-MCNC: 9.7 MG/DL (ref 0–0.4)
BILIRUB SERPL-MCNC: 11.7 MG/DL (ref 0.2–1.3)
BILIRUB UR QL STRIP: (no result)
BUN SERPL-MCNC: 10 MG/DL (ref 7–20)
CALCIUM: 9.9 MG/DL (ref 8.4–10.2)
CHLORIDE SERPL-SCNC: 97 MMOL/L (ref 98–107)
CO2 SERPL-SCNC: 27 MMOL/L (ref 22–30)
EOSINOPHIL NFR BLD MANUAL: 4 % (ref 0–6)
ERYTHROCYTE [DISTWIDTH] IN BLOOD BY AUTOMATED COUNT: 15.3 % (ref 11.5–14)
GLUCOSE SERPL-MCNC: 347 MG/DL (ref 75–110)
GLUCOSE UR STRIP-MCNC: >=500 MG/DL
HCT VFR BLD CALC: 43.9 % (ref 36–47)
HGB BLD-MCNC: 14.9 G/DL (ref 12–15.5)
INR PPP: 0.92
KETONES UR STRIP-MCNC: (no result) MG/DL
LIPASE SERPL-CCNC: 90.8 U/L (ref 23–300)
MCH RBC QN AUTO: 31.2 PG (ref 27–33.4)
MCHC RBC AUTO-ENTMCNC: 34 G/DL (ref 32–36)
MCV RBC AUTO: 92 FL (ref 80–97)
MONOCYTES % (MANUAL): 8 % (ref 3–13)
NEUTS BAND NFR BLD MANUAL: 1 % (ref 3–5)
NITRITE UR QL STRIP: NEGATIVE
PH UR STRIP: 6 [PH] (ref 5–9)
PLATELET # BLD: 363 10^3/UL (ref 150–450)
PLATELET COMMENT: ADEQUATE
POTASSIUM SERPL-SCNC: 4.8 MMOL/L (ref 3.6–5)
PROT SERPL-MCNC: 7.9 G/DL (ref 6.3–8.2)
PROT UR STRIP-MCNC: 30 MG/DL
PROTHROMBIN TIME: 12.8 SEC (ref 11.4–15.4)
RBC # BLD AUTO: 4.78 10^6/UL (ref 3.72–5.28)
SEGMENTED NEUTROPHILS % (MAN): 52 % (ref 42–78)
SODIUM SERPL-SCNC: 135.6 MMOL/L (ref 137–145)
SP GR UR STRIP: 1.04
TOTAL CELLS COUNTED BLD: 100
UROBILINOGEN UR-MCNC: 4 MG/DL (ref ?–2)
VARIANT LYMPHS NFR BLD MANUAL: 31 % (ref 13–45)
WBC # BLD AUTO: 10.5 10^3/UL (ref 4–10.5)
WBC TOXIC VACUOLES BLD QL SMEAR: PRESENT

## 2018-04-09 PROCEDURE — 74181 MRI ABDOMEN W/O CONTRAST: CPT

## 2018-04-09 PROCEDURE — 99285 EMERGENCY DEPT VISIT HI MDM: CPT

## 2018-04-09 PROCEDURE — 81025 URINE PREGNANCY TEST: CPT

## 2018-04-09 PROCEDURE — 85730 THROMBOPLASTIN TIME PARTIAL: CPT

## 2018-04-09 PROCEDURE — 85025 COMPLETE CBC W/AUTO DIFF WBC: CPT

## 2018-04-09 PROCEDURE — 80074 ACUTE HEPATITIS PANEL: CPT

## 2018-04-09 PROCEDURE — 96374 THER/PROPH/DIAG INJ IV PUSH: CPT

## 2018-04-09 PROCEDURE — 81001 URINALYSIS AUTO W/SCOPE: CPT

## 2018-04-09 PROCEDURE — 96361 HYDRATE IV INFUSION ADD-ON: CPT

## 2018-04-09 PROCEDURE — 80053 COMPREHEN METABOLIC PANEL: CPT

## 2018-04-09 PROCEDURE — 36415 COLL VENOUS BLD VENIPUNCTURE: CPT

## 2018-04-09 PROCEDURE — 85610 PROTHROMBIN TIME: CPT

## 2018-04-09 PROCEDURE — 83690 ASSAY OF LIPASE: CPT

## 2018-04-09 PROCEDURE — 80307 DRUG TEST PRSMV CHEM ANLYZR: CPT

## 2018-04-09 NOTE — RADIOLOGY REPORT (SQ)
EXAM DESCRIPTION:  MRI ABDOMEN WITHOUT



COMPLETED DATE/TIME:  4/9/2018 9:46 pm



REASON FOR STUDY:  mrcp, acute liver failure, eval choledocholithiasi



COMPARISON:  None.



TECHNIQUE:  Noncontrast MRCP.  Source and MIP images reviewed.



LIMITATIONS:  None.



FINDINGS:  GALLBLADDER: Surgically absent.

INTRAHEPATIC DUCTS: Nondilated.

EXTRAHEPATIC DUCTS: Common duct is normal caliber.  No dilatation of the pancreatic duct.  No ductal 
filling defects noted.

PANCREAS: Generally homogeneous, no gross mass or significant signal alteration.  No surrounding infl
ammatory changes or fluid. Pancreatic duct is normal.

LIVER, SPLEEN, KIDNEYS, ADRENALS: No significant abnormality.

VESSELS: No evidence of aneurysm.  Grossly appropriate flow voids in the major vascular structures.

LUNG BASES: Grossly clear.

OTHER: No other significant finding.



IMPRESSION:  1.  Status post cholecystectomy.  2. No evidence of overt bile duct dilatation or retain
ed duct stones.



TECHNICAL DOCUMENTATION:  JOB ID:  [id number]

 2011 [employer]- All Rights Reserved



Reading location - IP/workstation name: [email or web address]

## 2018-04-09 NOTE — ER DOCUMENT REPORT
ED General





- General


Chief Complaint: Jaundice


Stated Complaint: SKIN ISSUES


Time Seen by Provider: 18 15:41


Notes: 





Patient is a 32-year-old female with a past medical history of diabetes, prior 

history of cocaine abuse, who presents with 2 weeks of dark urine and noticed 

in her sclerae skin was yellow today.  She also notes that she has had a 13 

pound weight loss in the past 2 weeks that was unintentional.  She denies any 

history of similar symptoms in the past.  Nothing seems to improve or worsen 

her symptoms.  She denies any associated abdominal pain but does note that she 

had one episode of vomiting today.  She had a cholecystectomy 4 years ago.  She 

has not seen a primary doctor regarding today's concerns.  She has no prior 

history of viral hepatitis, denies any excessive Tylenol use, denies any IV 

drug use.  No risky sexual practices.  She was recently on clindamycin and 

levofloxacin for a right upper extremity arm infection but has since come off 

of both of these medications.


TRAVEL OUTSIDE OF THE [place] IN LAST 30 DAYS: No





- Related Data


Allergies/Adverse Reactions: 


 





Penicillins Allergy (Verified 18 19:38)


 











Past Medical History





- General


Information source: Patient





- Social History


Smoking Status: Current Every Day Smoker


Chew tobacco use (# tins/day): No


Frequency of alcohol use: None


Drug Abuse: None


Lives with: Spouse/Significant other


Family History: Reviewed & Not Pertinent


Patient has suicidal ideation: No


Patient has homicidal ideation: No


Neurological Medical History: Denies: Hx Seizures


Endocrine Medical History: Reports: Hx Diabetes Mellitus Type 2


Renal/ Medical History: Denies: Hx Peritoneal Dialysis


Psychiatric Medical History: Reports: Hx Anxiety, Hx Depression


Past Surgical History: Reports: Hx  Section, Hx Cholecystectomy





- Immunizations


Hx Diphtheria, Pertussis, Tetanus Vaccination: Yes





Review of Systems





- Review of Systems


Notes: 





Constitutional: Negative for fever.


HENT: Negative for sore throat.


Eyes: Negative for visual changes.


Cardiovascular: Negative for chest pain.


Respiratory: Negative for shortness of breath.


Gastrointestinal: Negative for abdominal pain, positive for one episode of 

vomiting


Genitourinary: Negative for dysuria.


Musculoskeletal: Negative for back pain.


Skin: Positive for icterus


Neurological: Negative for headaches, weakness or numbness.





10 point ROS negative except as marked above and in HPI.





Physical Exam





- Vital signs


Vitals: 


 











Temp Pulse Resp BP Pulse Ox


 


 97.7 F   104 H  16   118/82   98 


 


 18 15:03  18 15:03  18 15:03  18 15:03  18 15:03











Interpretation: Tachycardic


Notes: 





PHYSICAL EXAMINATION:





GENERAL: Obese female, no distress





HEAD: Atraumatic, normocephalic.





EYES: Pupils equal round and reactive to light, extraocular movements intact, 

sclera with icterus, conjunctiva are normal.





ENT: nares patent, oropharynx clear without exudates.  Moist mucous membranes.





NECK: Normal range of motion, supple without lymphadenopathy





LUNGS: Breath sounds clear to auscultation bilaterally and equal.  No wheezes 

rales or rhonchi.





HEART: Regular tachycardia without murmurs





ABDOMEN: Soft, nontender, normoactive bowel sounds.  No guarding, no rebound.  

No masses appreciated.





EXTREMITIES: Normal range of motion, no pitting or edema.  No cyanosis.





NEUROLOGICAL: No focal neurological deficits. Moves all extremities 

spontaneously and on command.





PSYCH: Normal mood, normal affect.





SKIN: Warm, diffuse icterus.





Course





- Re-evaluation


Re-evalutation: 





18 18:58


Patient presents with jaundice and dark urine.  Patient is obviously icteric 

diffusely but otherwise nontoxic in appearance, vitals normal limits.  She has 

no focal abdominal tenderness.  She is many years status post cholecystectomy 

making an acute choledocholithiasis or biliary obstruction unlikely as the 

etiology particularly given that she has no abdominal pain or additional 

symptoms that would suggest this diagnosis.  Patient was recently on 

levofloxacin as well as clindamycin for an arm cellulitis but review of medical 

literature does not show any significant occurrences of hepatic toxicity in 

association with levofloxacin or clindamycin.  Patient has no history of viral 

hepatitis in the past although panel has been sent here today.  She denies any 

risk factors for hepatitis B or C.  Her only symptom other than icterus is 

generalized fatigue and one episode of vomiting today.  We do not have GI 

coverage and I have contacted [facility] for a GI consultation.


18 19:18


I discussed this case with GI on-call at [facility] who has recommended that we do 

obtain imaging of the abdomen to rule out a structural abnormality.  I do still 

have the ability to obtain MRCP so this test will be obtained.  GI at [facility] 

has recommended if there is no evidence of structural abnormality they would 

recommend transfer to a site with transplant hepatology such as [facility] or [facility].  

He did mention that clindamycin has been noted to occasionally cause hepatic 

toxicity and could be the etiology of this patient's presentation but that he 

would recommend hospitalization for observation and repeat chemistry panel 

testing.


18 23:05


MRCP unremarkable.  No evidence of ductal dilation.  I have discussed this case 

with the accepting physician at [facility] Dr. Mackay was accepted the patient.


04/10/18 00:15


Patient stable for transport








- Vital Signs


Vital signs: 


 











Temp Pulse Resp BP Pulse Ox


 


 97.9 F   82   20   118/98 H  97 


 


 04/10/18 00:05  04/10/18 00:05  04/10/18 00:05  04/10/18 00:05  04/10/18 00:05














- Laboratory


Result Diagrams: 


 18 15:50





 18 15:50


Laboratory results interpreted by me: 


 











  18





  15:50 15:50 15:50


 


RDW  15.3 H  


 


Band Neutrophils %  1 L  


 


Sodium   135.6 L 


 


Chloride   97 L 


 


Glucose   347 H 


 


Total Bilirubin   11.7 H 


 


Direct Bilirubin   9.7 H 


 


AST   724 H 


 


ALT   677 H 


 


Alkaline Phosphatase   389 H 


 


Urine Protein    30 H


 


Urine Glucose (UA)    >=500 H


 


Urine Ketones    TRACE H


 


Urine Bilirubin    MODERATE H


 


Urine Urobilinogen    4.0 H


 


Ur Leukocyte Esterase    TRACE H


 


Acetaminophen   














  18





  15:50


 


RDW 


 


Band Neutrophils % 


 


Sodium 


 


Chloride 


 


Glucose 


 


Total Bilirubin 


 


Direct Bilirubin 


 


AST 


 


ALT 


 


Alkaline Phosphatase 


 


Urine Protein 


 


Urine Glucose (UA) 


 


Urine Ketones 


 


Urine Bilirubin 


 


Urine Urobilinogen 


 


Ur Leukocyte Esterase 


 


Acetaminophen  < 10 L














- Diagnostic Test


Radiology reviewed: Reports reviewed





Discharge





- Discharge


Clinical Impression: 


 Jaundice





Acute liver failure


Qualifiers:


 Hepatic coma status: without hepatic coma Qualified Code(s): K72.00 - Acute 

and subacute hepatic failure without coma





Nausea and vomiting


Qualifiers:


 Vomiting type: unspecified Vomiting Intractability: non-intractable Qualified 

Code(s): R11.2 - Nausea with vomiting, unspecified





Condition: Fair


Disposition: [facility]


Referrals: 


SHELTON DIAZ MD [Primary Care Provider] - Follow up as needed

## 2018-04-09 NOTE — ER DOCUMENT REPORT
ED Medical Screen (RME)





- General


Chief Complaint: Jaundice


Stated Complaint: SKIN ISSUES


Time Seen by Provider: 18 15:41


Notes: 





Patient recently had an abscess on her right arm that was incision and drained.

  She has been on Levaquin for this.  She has not taken any Levaquin in 

approximately 7 days however.  She states she noticed today that her urine was 

brown and that her skin looks yellow.  Therefore she came the emergency 

department.  She states she has been having some left upper quadrant abdominal 

pain.  She has had a previous cholecystectomy.


TRAVEL OUTSIDE OF THE [place] IN LAST 30 DAYS: No





- Related Data


Allergies/Adverse Reactions: 


 





Penicillins Allergy (Verified 18 19:38)


 











Past Medical History





- Social History


Chew tobacco use (# tins/day): No


Frequency of alcohol use: None


Drug Abuse: None


Neurological Medical History: Denies: Hx Seizures


Endocrine Medical History: Reports: Hx Diabetes Mellitus Type 2


Renal/ Medical History: Denies: Hx Peritoneal Dialysis


Psychiatric Medical History: Reports: Hx Anxiety, Hx Depression


Past Surgical History: Reports: Hx  Section, Hx Cholecystectomy





- Immunizations


Hx Diphtheria, Pertussis, Tetanus Vaccination: Yes


History of Influenza Vaccine for 10/2017 - 3/2018 Season: No





Physical Exam





- Vital signs


Vitals: 





 











Temp Pulse Resp BP Pulse Ox


 


 97.7 F   104 H  16   118/82   98 


 


 18 15:03  18 15:03  18 15:03  18 15:03  18 15:03














Course





- Vital Signs


Vital signs: 





 











Temp Pulse Resp BP Pulse Ox


 


 97.7 F   104 H  16   118/82   98 


 


 18 15:03  18 15:03  18 15:03  18 15:03  18 15:03

## 2018-04-10 VITALS — SYSTOLIC BLOOD PRESSURE: 118 MMHG | DIASTOLIC BLOOD PRESSURE: 98 MMHG

## 2018-04-11 LAB — HEPATITIS C VIRUS ANTIBODY: >11 S/CO RATIO (ref 0–0.9)
